# Patient Record
Sex: FEMALE | Race: BLACK OR AFRICAN AMERICAN | Employment: UNEMPLOYED | URBAN - METROPOLITAN AREA
[De-identification: names, ages, dates, MRNs, and addresses within clinical notes are randomized per-mention and may not be internally consistent; named-entity substitution may affect disease eponyms.]

---

## 2018-01-09 ENCOUNTER — HOSPITAL ENCOUNTER (INPATIENT)
Age: 23
LOS: 5 days | Discharge: HOME | DRG: 751 | End: 2018-01-15
Attending: EMERGENCY MEDICINE | Admitting: PSYCHIATRY & NEUROLOGY
Payer: MEDICAID

## 2018-01-09 DIAGNOSIS — F32.0 MILD SINGLE CURRENT EPISODE OF MAJOR DEPRESSIVE DISORDER (HCC): Primary | ICD-10-CM

## 2018-01-09 LAB
ACETAMINOPHEN LEVEL: <15 UG/ML (ref 10–30)
ALBUMIN SERPL-MCNC: 4.7 G/DL (ref 3.9–4.9)
ALP BLD-CCNC: 90 U/L (ref 40–130)
ALT SERPL-CCNC: 22 U/L (ref 0–33)
AMPHETAMINE SCREEN, URINE: NORMAL
ANION GAP SERPL CALCULATED.3IONS-SCNC: 30 MEQ/L (ref 7–13)
AST SERPL-CCNC: 18 U/L (ref 0–35)
BACTERIA: ABNORMAL /HPF
BARBITURATE SCREEN URINE: NORMAL
BASOPHILS ABSOLUTE: 0.1 K/UL (ref 0–0.2)
BASOPHILS RELATIVE PERCENT: 0.6 %
BENZODIAZEPINE SCREEN, URINE: NORMAL
BILIRUB SERPL-MCNC: 0.4 MG/DL (ref 0–1.2)
BILIRUBIN URINE: NEGATIVE
BLOOD, URINE: NEGATIVE
BUN BLDV-MCNC: 11 MG/DL (ref 6–20)
CALCIUM SERPL-MCNC: 9.9 MG/DL (ref 8.6–10.2)
CANNABINOID SCREEN URINE: NORMAL
CASTS: ABNORMAL /LPF
CHLORIDE BLD-SCNC: 103 MEQ/L (ref 98–107)
CHP ED QC CHECK: YES
CK MB: 1.5 NG/ML (ref 0–3.8)
CLARITY: CLEAR
CO2: 11 MEQ/L (ref 22–29)
COCAINE METABOLITE SCREEN URINE: NORMAL
COLOR: YELLOW
CREAT SERPL-MCNC: 0.92 MG/DL (ref 0.5–0.9)
CREATINE KINASE-MB INDEX: 0.6 % (ref 0–3.5)
EKG ATRIAL RATE: 117 BPM
EKG P AXIS: 60 DEGREES
EKG P-R INTERVAL: 130 MS
EKG Q-T INTERVAL: 334 MS
EKG QRS DURATION: 78 MS
EKG QTC CALCULATION (BAZETT): 465 MS
EKG R AXIS: 30 DEGREES
EKG T AXIS: 32 DEGREES
EKG VENTRICULAR RATE: 117 BPM
EOSINOPHILS ABSOLUTE: 0.1 K/UL (ref 0–0.7)
EOSINOPHILS RELATIVE PERCENT: 0.7 %
EPITHELIAL CELLS, UA: ABNORMAL /HPF
ETHANOL PERCENT: NORMAL G/DL
ETHANOL: <10 MG/DL (ref 0–0.08)
GFR AFRICAN AMERICAN: >60
GFR NON-AFRICAN AMERICAN: >60
GLOBULIN: 4.3 G/DL (ref 2.3–3.5)
GLUCOSE BLD-MCNC: 148 MG/DL (ref 74–109)
GLUCOSE URINE: NEGATIVE MG/DL
HCT VFR BLD CALC: 46.7 % (ref 37–47)
HEMOGLOBIN: 14.9 G/DL (ref 12–16)
KETONES, URINE: ABNORMAL MG/DL
LEUKOCYTE ESTERASE, URINE: ABNORMAL
LYMPHOCYTES ABSOLUTE: 4.8 K/UL (ref 1–4.8)
LYMPHOCYTES RELATIVE PERCENT: 38.9 %
Lab: NORMAL
MCH RBC QN AUTO: 26.9 PG (ref 27–31.3)
MCHC RBC AUTO-ENTMCNC: 32 % (ref 33–37)
MCV RBC AUTO: 84.1 FL (ref 82–100)
MONOCYTES ABSOLUTE: 1.2 K/UL (ref 0.2–0.8)
MONOCYTES RELATIVE PERCENT: 9.6 %
MUCUS: PRESENT
NEUTROPHILS ABSOLUTE: 6.2 K/UL (ref 1.4–6.5)
NEUTROPHILS RELATIVE PERCENT: 50.2 %
NITRITE, URINE: NEGATIVE
OPIATE SCREEN URINE: NORMAL
PDW BLD-RTO: 14.8 % (ref 11.5–14.5)
PH UA: 6 (ref 5–9)
PHENCYCLIDINE SCREEN URINE: NORMAL
PLATELET # BLD: 333 K/UL (ref 130–400)
POTASSIUM SERPL-SCNC: 4.5 MEQ/L (ref 3.5–5.1)
PREGNANCY TEST URINE, POC: NEGATIVE
PROTEIN UA: 100 MG/DL
RBC # BLD: 5.55 M/UL (ref 4.2–5.4)
RBC UA: ABNORMAL /HPF (ref 0–2)
RENAL EPITHELIAL, UA: ABNORMAL /HPF
SODIUM BLD-SCNC: 144 MEQ/L (ref 132–144)
SPECIFIC GRAVITY UA: 1.03 (ref 1–1.03)
TOTAL CK: 268 U/L (ref 0–170)
TOTAL PROTEIN: 9 G/DL (ref 6.4–8.1)
TSH SERPL DL<=0.05 MIU/L-ACNC: 1.51 UIU/ML (ref 0.27–4.2)
URINE REFLEX TO CULTURE: YES
UROBILINOGEN, URINE: 0.2 E.U./DL
WBC # BLD: 12.4 K/UL (ref 4.5–11)
WBC UA: ABNORMAL /HPF (ref 0–5)

## 2018-01-09 PROCEDURE — 80053 COMPREHEN METABOLIC PANEL: CPT

## 2018-01-09 PROCEDURE — G0480 DRUG TEST DEF 1-7 CLASSES: HCPCS

## 2018-01-09 PROCEDURE — 81001 URINALYSIS AUTO W/SCOPE: CPT

## 2018-01-09 PROCEDURE — 80307 DRUG TEST PRSMV CHEM ANLYZR: CPT

## 2018-01-09 PROCEDURE — 99285 EMERGENCY DEPT VISIT HI MDM: CPT

## 2018-01-09 PROCEDURE — 82550 ASSAY OF CK (CPK): CPT

## 2018-01-09 PROCEDURE — 84443 ASSAY THYROID STIM HORMONE: CPT

## 2018-01-09 PROCEDURE — 82553 CREATINE MB FRACTION: CPT

## 2018-01-09 PROCEDURE — 36415 COLL VENOUS BLD VENIPUNCTURE: CPT

## 2018-01-09 PROCEDURE — 85025 COMPLETE CBC W/AUTO DIFF WBC: CPT

## 2018-01-09 PROCEDURE — 87086 URINE CULTURE/COLONY COUNT: CPT

## 2018-01-09 RX ORDER — PROCHLORPERAZINE MALEATE 5 MG/1
5 TABLET ORAL EVERY 6 HOURS PRN
Status: ON HOLD | COMMUNITY
End: 2018-01-15 | Stop reason: HOSPADM

## 2018-01-09 RX ORDER — BUPROPION HYDROCHLORIDE 150 MG/1
150 TABLET, EXTENDED RELEASE ORAL 2 TIMES DAILY
Status: ON HOLD | COMMUNITY
End: 2018-01-15 | Stop reason: HOSPADM

## 2018-01-09 RX ORDER — CITALOPRAM 40 MG/1
40 TABLET ORAL DAILY
Status: ON HOLD | COMMUNITY
End: 2018-11-02 | Stop reason: HOSPADM

## 2018-01-09 RX ORDER — TRAZODONE HYDROCHLORIDE 50 MG/1
50 TABLET ORAL NIGHTLY
Status: ON HOLD | COMMUNITY
End: 2018-01-15 | Stop reason: HOSPADM

## 2018-01-09 ASSESSMENT — ENCOUNTER SYMPTOMS
VOMITING: 0
CHEST TIGHTNESS: 0
WHEEZING: 0
COUGH: 0
ABDOMINAL PAIN: 0
EYE DISCHARGE: 0
SORE THROAT: 0
ABDOMINAL DISTENTION: 0
SHORTNESS OF BREATH: 0
PHOTOPHOBIA: 0

## 2018-01-09 NOTE — Clinical Note
Patient Class: Inpatient [101]   REQUIRED: Diagnosis: Major depression, recurrent ([facility] [[id number]]   Estimated Length of Stay: Estimated stay of more than 2 midnights   Future Attending Provider: Edson Phillips [[id number]]   Admitting Provider: Farzaneh Ca [[id number]]

## 2018-01-10 PROBLEM — F43.12 CHRONIC POST-TRAUMATIC STRESS DISORDER (PTSD): Status: ACTIVE | Noted: 2018-01-10

## 2018-01-10 PROBLEM — F33.9 MAJOR DEPRESSION, RECURRENT (HCC): Status: ACTIVE | Noted: 2018-01-10

## 2018-01-10 PROBLEM — F33.9 MAJOR DEPRESSION, RECURRENT (HCC): Status: RESOLVED | Noted: 2018-01-10 | Resolved: 2018-01-10

## 2018-01-10 PROBLEM — F33.2 MDD (MAJOR DEPRESSIVE DISORDER), RECURRENT EPISODE, SEVERE (HCC): Status: ACTIVE | Noted: 2018-01-10

## 2018-01-10 LAB
BASOPHILS ABSOLUTE: 0.1 K/UL (ref 0–0.2)
BASOPHILS RELATIVE PERCENT: 0.7 %
EOSINOPHILS ABSOLUTE: 0.1 K/UL (ref 0–0.7)
EOSINOPHILS RELATIVE PERCENT: 1.4 %
HCT VFR BLD CALC: 43.9 % (ref 37–47)
HEMOGLOBIN: 14.3 G/DL (ref 12–16)
LYMPHOCYTES ABSOLUTE: 3.1 K/UL (ref 1–4.8)
LYMPHOCYTES RELATIVE PERCENT: 29.6 %
MCH RBC QN AUTO: 26.8 PG (ref 27–31.3)
MCHC RBC AUTO-ENTMCNC: 32.5 % (ref 33–37)
MCV RBC AUTO: 82.5 FL (ref 82–100)
MONOCYTES ABSOLUTE: 1.1 K/UL (ref 0.2–0.8)
MONOCYTES RELATIVE PERCENT: 10.3 %
NEUTROPHILS ABSOLUTE: 6 K/UL (ref 1.4–6.5)
NEUTROPHILS RELATIVE PERCENT: 58 %
PDW BLD-RTO: 14.5 % (ref 11.5–14.5)
PLATELET # BLD: 277 K/UL (ref 130–400)
RBC # BLD: 5.32 M/UL (ref 4.2–5.4)
WBC # BLD: 10.4 K/UL (ref 4.5–11)

## 2018-01-10 PROCEDURE — 87040 BLOOD CULTURE FOR BACTERIA: CPT

## 2018-01-10 PROCEDURE — 36415 COLL VENOUS BLD VENIPUNCTURE: CPT

## 2018-01-10 PROCEDURE — 85025 COMPLETE CBC W/AUTO DIFF WBC: CPT

## 2018-01-10 PROCEDURE — 93005 ELECTROCARDIOGRAM TRACING: CPT

## 2018-01-10 PROCEDURE — 6370000000 HC RX 637 (ALT 250 FOR IP): Performed by: NURSE PRACTITIONER

## 2018-01-10 PROCEDURE — 1240000000 HC EMOTIONAL WELLNESS R&B

## 2018-01-10 PROCEDURE — 93010 ELECTROCARDIOGRAM REPORT: CPT | Performed by: INTERNAL MEDICINE

## 2018-01-10 PROCEDURE — 99223 1ST HOSP IP/OBS HIGH 75: CPT | Performed by: PSYCHIATRY & NEUROLOGY

## 2018-01-10 PROCEDURE — 6370000000 HC RX 637 (ALT 250 FOR IP): Performed by: PSYCHIATRY & NEUROLOGY

## 2018-01-10 RX ORDER — HYDROXYZINE HYDROCHLORIDE 50 MG/ML
50 INJECTION, SOLUTION INTRAMUSCULAR EVERY 6 HOURS PRN
Status: DISCONTINUED | OUTPATIENT
Start: 2018-01-10 | End: 2018-01-15 | Stop reason: HOSPADM

## 2018-01-10 RX ORDER — CITALOPRAM 20 MG/1
40 TABLET ORAL DAILY
Status: DISCONTINUED | OUTPATIENT
Start: 2018-01-10 | End: 2018-01-15 | Stop reason: HOSPADM

## 2018-01-10 RX ORDER — TRAZODONE HYDROCHLORIDE 50 MG/1
50 TABLET ORAL NIGHTLY PRN
Status: DISCONTINUED | OUTPATIENT
Start: 2018-01-10 | End: 2018-01-11

## 2018-01-10 RX ORDER — HALOPERIDOL 5 MG
5 TABLET ORAL EVERY 6 HOURS PRN
Status: DISCONTINUED | OUTPATIENT
Start: 2018-01-10 | End: 2018-01-15 | Stop reason: HOSPADM

## 2018-01-10 RX ORDER — IBUPROFEN 400 MG/1
400 TABLET ORAL EVERY 6 HOURS PRN
Status: DISCONTINUED | OUTPATIENT
Start: 2018-01-10 | End: 2018-01-15 | Stop reason: HOSPADM

## 2018-01-10 RX ORDER — ACETAMINOPHEN 325 MG/1
650 TABLET ORAL EVERY 4 HOURS PRN
Status: DISCONTINUED | OUTPATIENT
Start: 2018-01-10 | End: 2018-01-10

## 2018-01-10 RX ADMIN — IBUPROFEN 400 MG: 400 TABLET, FILM COATED ORAL at 16:48

## 2018-01-10 RX ADMIN — CITALOPRAM HYDROBROMIDE 40 MG: 20 TABLET ORAL at 11:44

## 2018-01-10 ASSESSMENT — PAIN SCALES - GENERAL: PAINLEVEL_OUTOF10: 0

## 2018-01-10 ASSESSMENT — PAIN - FUNCTIONAL ASSESSMENT: PAIN_FUNCTIONAL_ASSESSMENT: 0-10
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
## 2018-01-10 NOTE — ED NOTES
[facility] RN aware of pt medically cleared, they will call when ready to get pt      Cheryl Dubois RN  01/09/18 7681

## 2018-01-10 NOTE — ED NOTES
Pt in bed, lights off, curtain open to visualize pt, call light with in reach, eyes closed, pt lying on right side.  She appears in no acute distress at this time will continue to monitor      Shivani Pascual RN  01/09/18 6684

## 2018-01-10 NOTE — ED NOTES
Pt assisted to bedside commode with help of this RN and 1 tech, urine sample obtained and sent, POC preg ran     Nando Skelton, [address] [facility]  01/09/18 7378

## 2018-01-10 NOTE — PROGRESS NOTES
Pt. refused to attend the 1100 skills group due to resting in room, despite staff encouragement.  Electronically signed by Paco Watts on 1/10/2018 at 1:22 PM

## 2018-01-10 NOTE — PROGRESS NOTES
Patient arrived to the unit via wheelchair accompanied by RN and security. Pt was asked to change into hospital pants and gown, skin and contraband check completed by this and ER RN, skin intact, no contraband found. Pt oriented to unit surroundings, policies and services, shown to room and advised of the need to complete admission assessment and sign consents, denies any needs at this time. Pt denies current suicidal thoughts, states she is tired and declined admission assessment/consents.  Electronically signed by Kathie Lucio RN on 1/10/18 at 4:59 AM

## 2018-01-10 NOTE — ED NOTES
Pt tolerated move to MARYCARMEN, via w/c. Used bathroom, gait steady. Given orientation to unit and [address] process with understanding verbalized. Report received from Dr Shelton Garibay re medical clearance.      Long Knowles RN  01/10/18 0005

## 2018-01-10 NOTE — PROGRESS NOTES
Patient denies SI, HI, and AVH. Patient reports depression is 1/10. Reports that being away from home environment has made depression minimal. Denies any anxiety. Patient has been resting in room throughout evening. Polite and cooperative with staff.  Electronically signed by Laya Campoverde LPN on 6/36/5257 at 0:04 PM

## 2018-01-10 NOTE — CONSULTS
and otherwise negative. OBJECTIVE  PHYSICAL EXAM: BP (!) 144/90   Pulse 107   Temp 99.1 °F (37.3 °C)   Resp 18   Wt (!) 352 lb (159.7 kg)   LMP  (LMP Unknown)   SpO2 98%     General appearance:  No apparent distress, appears stated age and cooperative. HEENT:  Normal cephalic, atraumatic without obvious deformity. Pupils equal, round, and reactive to light. Extra ocular muscles intact. Conjunctivae/corneas clear. Neck: Supple, with full range of motion. No jugular venous distention. Trachea midline. Respiratory:  Normal respiratory effort. Clear to auscultation, bilaterally without Rales/Wheezes/Rhonchi. Cardiovascular:  Regular rate and rhythm with normal S1/S2 without murmurs, rubs or gallops. Abdomen: Soft, non-tender, non-distended with normal bowel sounds. Musculoskeletal:  No clubbing, cyanosis or edema bilaterally. Full range of motion without deformity. Skin: Skin color, texture, turgor normal.  No rashes or lesions. Neurologic:  Neurovascularly intact without any focal sensory/motor deficits. Psychiatric:  Alert and oriented, thought content appropriate, normal insight  Capillary Refill: Brisk,< 3 seconds   Peripheral Pulses: +2 palpable, equal bilaterally     DATA:     Diagnostic tests reviewed for today's visit:    Most recent labs and imaging results reviewed. ASSESSMENT AND PLAN  Patient Active Hospital Problem List:   MDD (major depressive disorder), recurrent episode, severe ([facility] (1/10/2018)    Assessment:     Plan:    Chronic post-traumatic stress disorder (PTSD) (1/10/2018)    Assessment:     Plan:     Fever of unknown origin: cultures pending, tylenol prn  Leukocytosis: cbc pending        This is only a history and physical examination and not medical management.  The patient is to contact and follow up with their primary care physician and go over any abnormal labs, imaging, findings, medical concerns, or conditions that we have and have not addressed during this

## 2018-01-10 NOTE — ED PROVIDER NOTES
problems and hallucinations. All other systems reviewed and are negative. Except as noted above the remainder of the review of systems was reviewed and negative. PAST MEDICAL HISTORY     Past Medical History:   Diagnosis Date    Depression     PTSD (post-traumatic stress disorder)          SURGICAL HISTORY     History reviewed. No pertinent surgical history. CURRENT MEDICATIONS       Previous Medications    BUPROPION (WELLBUTRIN SR) 150 MG EXTENDED RELEASE TABLET    Take 150 mg by mouth 2 times daily    CITALOPRAM (CELEXA) 40 MG TABLET    Take 40 mg by mouth daily    PROCHLORPERAZINE (COMPAZINE) 5 MG TABLET    Take 5 mg by mouth every 6 hours as needed for Nausea    TRAZODONE (DESYREL) 50 MG TABLET    Take 50 mg by mouth nightly       ALLERGIES     Tylenol [acetaminophen]    FAMILY HISTORY     History reviewed. No pertinent family history. SOCIAL HISTORY       Social History     Social History    Marital status: Single     Spouse name: N/A    Number of children: N/A    Years of education: N/A     Social History Main Topics    Smoking status: Never Smoker    Smokeless tobacco: Never Used    Alcohol use No    Drug use: Unknown    Sexual activity: Not Asked     Other Topics Concern    None     Social History Narrative    None       SCREENINGS    [place] Coma Scale  Eye Opening: Spontaneous  Best Verbal Response: Oriented  Best Motor Response: Obeys commands  [place] Coma Scale Score: 15        PHYSICAL EXAM    (up to 7 for level 4, 8 or more for level 5)     ED Triage Vitals [01/09/18 2003]   BP Temp Temp Source Pulse Resp SpO2 Height Weight   (!) 146/85 98 °F (36.7 °C) Oral 141 20 99 % -- (!) 352 lb (159.7 kg)       Physical Exam   Constitutional: She is oriented to person, place, and time. She appears well-developed. HENT:   Head: Normocephalic. Nose: Nose normal.   Eyes: Conjunctivae are normal. Pupils are equal, round, and reactive to light. Neck: Normal range of motion. with a voice recognition program.  Efforts were made to edit the dictations but occasionally words are mis-transcribed.)    Roula Weaver MD (electronically signed)  Attending Emergency Physician          Roula Weaver MD  01/09/18 8397       Roula Weaver MD  01/10/18 2829

## 2018-01-11 LAB — URINE CULTURE, ROUTINE: NORMAL

## 2018-01-11 PROCEDURE — 1240000000 HC EMOTIONAL WELLNESS R&B

## 2018-01-11 PROCEDURE — 99232 SBSQ HOSP IP/OBS MODERATE 35: CPT | Performed by: PSYCHIATRY & NEUROLOGY

## 2018-01-11 PROCEDURE — 6370000000 HC RX 637 (ALT 250 FOR IP): Performed by: PSYCHIATRY & NEUROLOGY

## 2018-01-11 RX ORDER — TRAZODONE HYDROCHLORIDE 50 MG/1
50 TABLET ORAL NIGHTLY
Status: DISCONTINUED | OUTPATIENT
Start: 2018-01-11 | End: 2018-01-12

## 2018-01-11 RX ADMIN — CITALOPRAM HYDROBROMIDE 40 MG: 20 TABLET ORAL at 09:25

## 2018-01-11 RX ADMIN — TRAZODONE HYDROCHLORIDE 50 MG: 50 TABLET ORAL at 20:59

## 2018-01-11 NOTE — PLAN OF CARE
Problem: Altered Mood, Depressive Behavior  Goal: LTG-Able to verbalize acceptance of life and situations over which he or she has no control  Outcome: Ongoing    Goal: LTG-Able to verbalize and/or display a decrease in depressive symptoms  Outcome: Met This Shift    Goal: STG-Able to verbalize suicidal ideations  Outcome: Met This Shift    Goal: STG-Able to verbalize support system  Outcome: Ongoing    Goal: LTG-Absence of self-harm  Outcome: Met This Shift    Goal: STG-Knowledge of positive coping patterns  Outcome: Ongoing    Goal: Patient Specific Goal  Outcome: Ongoing    Goal: Participation in care planning  Outcome: Ongoing

## 2018-01-11 NOTE — PROGRESS NOTES
Patient out of room for meals. Spent time in room, patient reports that her Celexa makes her tired. She denied SI, HI, and AVH. Affect was reactive and she had good eye contact. She was polite, cooperative and pleasant with assessment. She also spoke about difficulty getting along with her Mother and is hopeful to move out soon and get her own place. 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 Pt hypertensive in AM, recheck WNL. No additional complaint voiced to staff Will continue to monitor.

## 2018-01-12 PROCEDURE — 6370000000 HC RX 637 (ALT 250 FOR IP): Performed by: PSYCHIATRY & NEUROLOGY

## 2018-01-12 PROCEDURE — 90833 PSYTX W PT W E/M 30 MIN: CPT | Performed by: PSYCHIATRY & NEUROLOGY

## 2018-01-12 PROCEDURE — 1240000000 HC EMOTIONAL WELLNESS R&B

## 2018-01-12 PROCEDURE — 99232 SBSQ HOSP IP/OBS MODERATE 35: CPT | Performed by: PSYCHIATRY & NEUROLOGY

## 2018-01-12 RX ORDER — TRAZODONE HYDROCHLORIDE 100 MG/1
100 TABLET ORAL NIGHTLY
Status: DISCONTINUED | OUTPATIENT
Start: 2018-01-12 | End: 2018-01-13

## 2018-01-12 RX ORDER — DOCUSATE SODIUM 100 MG/1
100 CAPSULE, LIQUID FILLED ORAL DAILY
Status: DISCONTINUED | OUTPATIENT
Start: 2018-01-12 | End: 2018-01-15 | Stop reason: HOSPADM

## 2018-01-12 RX ADMIN — CITALOPRAM HYDROBROMIDE 40 MG: 20 TABLET ORAL at 09:19

## 2018-01-12 RX ADMIN — DOCUSATE SODIUM 100 MG: 100 CAPSULE, LIQUID FILLED ORAL at 13:07

## 2018-01-12 RX ADMIN — TRAZODONE HYDROCHLORIDE 100 MG: 100 TABLET ORAL at 20:53

## 2018-01-12 ASSESSMENT — LIFESTYLE VARIABLES: HISTORY_ALCOHOL_USE: NO

## 2018-01-12 NOTE — CARE COORDINATION
Brief Intervention and Referral to Treatment Summary    Patient was provided PHQ-9, AUDIT and DAST Screening:      PHQ-9 Score: not completed  AUDIT Score:  0  DAST Score: 0    Patients substance use is considered    Low Risk/Healthy x  Risk  Harmful  Dependent    Patients depression is considered:    Minimal  Mild   Moderate x per pt. Moderately Severe  Severe    Brief Education was provided:    Patient was receptive x  Patient was not receptive      Brief Intervention Is Provided (Only for AUDIT or DAST, there is no brief intervention for Depression)    Patient reports readiness to decrease and/or stop use and a plan was discussed   Patient denies readiness to decrease and/or stop use and a plan was not discussed      Recommendations/Referrals for Brief and/or Specialized Treatment Provided to Patient  Patient denies use of ETOH or illegal drugs. Pt. Rece[facility] at the [facility].   Electronically signed by Katalina Vines on 1/12/2018 at 8:05 AM

## 2018-01-12 NOTE — CARE COORDINATION
FAMILY COLLATERAL NOTE    Family/Support Name: Waylon Johnson  Contact #:[phone]  Relationship to Pt[de-identified] mom        Family/Support contact aware of hospitalization:yes  Presenting Symptoms/Current Concerns: she had a bad attitude towards me, I feel like it was over something and it happened too fast, I dont remember too much about what happened. I got up and left the house before anything happened. I think that is when she took the overdose. I came back and she was gone and I dont know where she went. She did not say anything to me about wanting to harm herself. She did say to me and my mom that she thought she was a burden and we told her she was not. Top 3 Life Stressors:   I really dont know cause we really dont talk much. Background History Relevant to Current Hospitalization: my mom had custody of her since she was 8 mos old          Family Mental Health/Substance Use History: I have dystemia        Support Network's Goal for Hospitalization: I would like for her to stop feeling like I dont care about her, I do care, I just dont know how to react or what to say, she has a great personality and is a wonderful person    Discharge Plan: patient can come back to the home to live, follow up with outpt. Support Network Supportive of Discharge Plan:  yes      Support can confirm Safety of Location and Security of Weapons: there are no weapons in the home, home is safe      Support agreeable to [employer] and Monitor Medications (including Prescription and OTC): I can lock up all meds and all OTC meds    Identified Barriers to Compliance with Discharge Plan: none    Recommendations for Support Network:   [facility] or [facility] if any questions after discharge.   Electronically signed by Katalina Gandhi on 1/12/2018 at 10:54 AM      Katalina Gandhi

## 2018-01-12 NOTE — PROGRESS NOTES
01/11/18 2059    citalopram (CELEXA) tablet 40 mg, 40 mg, Oral, Daily, Aislinn Rebollar MD, 40 mg at 01/12/18 0919    haloperidol (HALDOL) tablet 5 mg, 5 mg, Oral, Q6H PRN, Aislinn Rebollar MD    hydrOXYzine (VISTARIL) injection 50 mg, 50 mg, Intramuscular, Q6H PRN, Aislinn Rebollar MD    ibuprofen (ADVIL;MOTRIN) tablet 400 mg, 400 mg, Oral, Q6H PRN, Rogelio Garber, CNP, 400 mg at 01/10/18 1648      Examination:  /81   Pulse 84   Temp 98 °F (36.7 °C)   Resp 20   Ht 5' 9\" (1.753 m) Comment:  estimate  Wt (!) 352 lb (159.7 kg)   LMP  (LMP Unknown)   SpO2 97%   BMI 51.98 kg/m²   Gait - steady  Medication side effects(SE): no    Mental Status Examination:    Level of consciousness:  within normal limits   Appearance:  fair grooming and fair hygiene  Behavior/Motor:  psychomotor retardation  Attitude toward examiner:  cooperative  Speech:  slow   Mood: less depressed  Affect:  mood congruent  Thought processes:  linear and goal directed   Thought content:  Suicidal Ideation:  denies suicidal ideation  Delusions:  no evidence of delusions  Perceptual Disturbance:  denies any perceptual disturbance  Cognition:  oriented to person, place, and time   Concentration intact  Insight fair   Judgement fair     ASSESSMENT:   Patient symptoms are:  [] Well controlled  [x] Improving  [] Worsening  [] No change      Diagnosis:   Active Problems:    MDD (major depressive disorder), recurrent episode, severe (HCC)    Chronic post-traumatic stress disorder (PTSD)      LABS:    Recent Labs      01/09/18   2015  01/10/18   1210   WBC  12.4*  10.4   HGB  14.9  14.3   PLT  333  277     Recent Labs      01/09/18 2015   NA  144   K  4.5   CL  103   CO2  11*   BUN  11   CREATININE  0.92*   GLUCOSE  148*     Recent Labs      01/09/18 2015   BILITOT  0.4   ALKPHOS  90   AST  18   ALT  22     Lab Results   Component Value Date    LABAMPH Neg 01/09/2018    BARBSCNU Neg 01/09/2018    LABBENZ Neg 01/09/2018

## 2018-01-13 PROCEDURE — 6370000000 HC RX 637 (ALT 250 FOR IP): Performed by: PSYCHIATRY & NEUROLOGY

## 2018-01-13 PROCEDURE — 1240000000 HC EMOTIONAL WELLNESS R&B

## 2018-01-13 PROCEDURE — 6370000000 HC RX 637 (ALT 250 FOR IP): Performed by: NURSE PRACTITIONER

## 2018-01-13 RX ORDER — HYDROCHLOROTHIAZIDE 25 MG/1
25 TABLET ORAL DAILY
Status: DISCONTINUED | OUTPATIENT
Start: 2018-01-13 | End: 2018-01-15 | Stop reason: HOSPADM

## 2018-01-13 RX ORDER — TRAZODONE HYDROCHLORIDE 100 MG/1
200 TABLET ORAL NIGHTLY
Status: DISCONTINUED | OUTPATIENT
Start: 2018-01-13 | End: 2018-01-13

## 2018-01-13 RX ORDER — LANOLIN ALCOHOL/MO/W.PET/CERES
6 CREAM (GRAM) TOPICAL NIGHTLY PRN
Status: DISCONTINUED | OUTPATIENT
Start: 2018-01-13 | End: 2018-01-15 | Stop reason: HOSPADM

## 2018-01-13 RX ORDER — TRAZODONE HYDROCHLORIDE 150 MG/1
150 TABLET ORAL NIGHTLY PRN
Status: DISCONTINUED | OUTPATIENT
Start: 2018-01-13 | End: 2018-01-15 | Stop reason: HOSPADM

## 2018-01-13 RX ADMIN — MELATONIN TAB 3 MG 6 MG: 3 TAB at 21:16

## 2018-01-13 RX ADMIN — TRAZODONE HYDROCHLORIDE 150 MG: 150 TABLET ORAL at 21:16

## 2018-01-13 RX ADMIN — DOCUSATE SODIUM 100 MG: 100 CAPSULE, LIQUID FILLED ORAL at 08:03

## 2018-01-13 RX ADMIN — CITALOPRAM HYDROBROMIDE 40 MG: 20 TABLET ORAL at 08:03

## 2018-01-13 RX ADMIN — HYDROCHLOROTHIAZIDE 25 MG: 25 TABLET ORAL at 13:58

## 2018-01-13 ASSESSMENT — PAIN - FUNCTIONAL ASSESSMENT: PAIN_FUNCTIONAL_ASSESSMENT: 0-10

## 2018-01-13 NOTE — PROGRESS NOTES
Pt. attended the 0900 community meeting. Electronically signed by Narayan Betancourt [address] on 1/13/2018 at 11:18 AM

## 2018-01-13 NOTE — PROGRESS NOTES
Oblong Becca, CNP, 25 mg at 01/13/18 1358    traZODone (DESYREL) tablet 150 mg, 150 mg, Oral, Nightly PRN, Jennifer Sumner MD    melatonin tablet 6 mg, 6 mg, Oral, Nightly PRN, Jennifer Sumner MD    docusate sodium (COLACE) capsule 100 mg, 100 mg, Oral, Daily, Khadijah Serrato MD, 100 mg at 01/13/18 0803    citalopram (CELEXA) tablet 40 mg, 40 mg, Oral, Daily, Khadijah Serrato MD, 40 mg at 01/13/18 0803    haloperidol (HALDOL) tablet 5 mg, 5 mg, Oral, Q6H PRN, Khadijah Serrato MD    hydrOXYzine (VISTARIL) injection 50 mg, 50 mg, Intramuscular, Q6H PRN, Khadijah Serrato MD    ibuprofen (ADVIL;MOTRIN) tablet 400 mg, 400 mg, Oral, Q6H PRN, Yamilet Hudson CNP, 400 mg at 01/10/18 1648      Examination:  BP (!) 141/84   Pulse 119   Temp 98.6 °F (37 °C)   Resp 20   Ht 5' 9\" (1.753 m) Comment:  estimate  Wt (!) 352 lb (159.7 kg)   LMP  (LMP Unknown)   SpO2 99%   BMI 51.98 kg/m²   Gait - steady  Medication side effects(SE): no    Mental Status Examination:    Level of consciousness:  within normal limits   Appearance:  fair grooming and fair hygiene  Behavior/Motor:  psychomotor retardation  Attitude toward examiner:  cooperative  Speech:  slow   Mood: less depressed  Affect:  mood congruent  Thought processes:  linear and goal directed   Thought content:  Suicidal Ideation:  denies suicidal ideation  Delusions:  no evidence of delusions  Perceptual Disturbance:  denies any perceptual disturbance  Cognition:  oriented to person, place, and time   Concentration intact  Insight fair   Judgement fair     ASSESSMENT:   Patient symptoms are:  [] Well controlled  [x] Improving  [] Worsening  [] No change      Diagnosis:   Active Problems:    MDD (major depressive disorder), recurrent episode, severe (HCC)    Chronic post-traumatic stress disorder (PTSD)      LABS:    No results for input(s): WBC, HGB, PLT in the last 72 hours.   No results for input(s): NA, K, CL, CO2, BUN, CREATININE, GLUCOSE in the last 72 hours. No results for input(s): BILITOT, ALKPHOS, AST, ALT in the last 72 hours. Lab Results   Component Value Date    LABAMPH Neg 01/09/2018    BARBSCNU Neg 01/09/2018    LABBENZ Neg 01/09/2018    OPIATESCREENURINE Neg 01/09/2018    PHENCYCLIDINESCREENURINE Neg 01/09/2018    ETOH <10 01/09/2018     Lab Results   Component Value Date    TSH 1.510 01/09/2018     No results found for: LITHIUM  No results found for: VALPROATE, CBMZ      Treatment Plan:  Reviewed current Medications with the patient. Increase trazodone to 150 mg po qhs; add Melatonin  Continue celexa 40 mg   Risks, benefits, side effects, drug-to-drug interactions and alternatives to treatment were discussed. Collateral information: pending  CD evaluation  Encourage patient to attend group and other milieu activities.   Discharge planning discussed with the patient and treatment team.    PSYCHOTHERAPY/COUNSELING:  [x] Therapeutic interview  [x] Supportive  [] CBT  [] Ongoing  [] Other       [x] Patient continues to need, on a daily basis, active treatment furnished directly by or requiring the supervision of inpatient psychiatric personnel                  Electronically signed by Ming Loya MD on 1/13/2018 at 5:39 PM

## 2018-01-13 NOTE — PLAN OF CARE
Problem: Altered Mood, Depressive Behavior  Goal: LTG-Able to verbalize acceptance of life and situations over which he or she has no control  Outcome: Met This Shift    Goal: LTG-Able to verbalize and/or display a decrease in depressive symptoms  Outcome: Met This Shift    Goal: STG-Able to verbalize suicidal ideations  Outcome: Met This Shift    Goal: STG-Able to verbalize support system  Outcome: Met This Shift    Goal: LTG-Absence of self-harm  Outcome: Met This Shift    Goal: STG-Knowledge of positive coping patterns  Outcome: Met This Shift    Goal: Patient Specific Goal  Outcome: Met This Shift    Goal: Participation in care planning  Outcome: Met This Shift

## 2018-01-13 NOTE — PROGRESS NOTES
Group Therapy Note    Date: 1/13/2018  Start Time: 1000  End Time:  1100  Number of Participants: 10    Type of Group: Psychoeducation    Wellness Binder Information  Module Name:    Session Number:      Patient's Goal:  \"To feel better\"    Notes:  Patient was attentive and overall participation in group was fair. Status After Intervention:  Unchanged    Participation Level:  Active Listener    Participation Quality: Appropriate and Attentive      Speech:  normal      Thought Process/Content: Linear      Affective Functioning: Congruent      Mood: calm      Level of consciousness:  Alert and Oriented x4      Response to Learning: Able to verbalize current knowledge/experience      Endings: None Reported    Modes of Intervention: Education, Socialization and Activity      Discipline Responsible: Psychoeducational Specialist      Signature:  Lauren Devries

## 2018-01-14 PROCEDURE — 6370000000 HC RX 637 (ALT 250 FOR IP): Performed by: NURSE PRACTITIONER

## 2018-01-14 PROCEDURE — 1240000000 HC EMOTIONAL WELLNESS R&B

## 2018-01-14 PROCEDURE — 6370000000 HC RX 637 (ALT 250 FOR IP): Performed by: PSYCHIATRY & NEUROLOGY

## 2018-01-14 RX ADMIN — MELATONIN TAB 3 MG 6 MG: 3 TAB at 21:28

## 2018-01-14 RX ADMIN — DOCUSATE SODIUM 100 MG: 100 CAPSULE, LIQUID FILLED ORAL at 08:51

## 2018-01-14 RX ADMIN — CITALOPRAM HYDROBROMIDE 40 MG: 20 TABLET ORAL at 08:51

## 2018-01-14 RX ADMIN — TRAZODONE HYDROCHLORIDE 150 MG: 150 TABLET ORAL at 21:28

## 2018-01-14 RX ADMIN — HYDROCHLOROTHIAZIDE 25 MG: 25 TABLET ORAL at 08:51

## 2018-01-14 NOTE — PROGRESS NOTES
Group Therapy Note    Date: 1/14/2018  Start Time: 1000  End Time:  1100  Number of Participants: 10    Type of Group: Psychoeducation    Wellness Binder Information  Module Name:    Session Number:     Patient's Goal:  \"To feel better\"    Notes:  Patient was calm and overall participation was good. Status After Intervention:  Unchanged    Participation Level:  Active Listener    Participation Quality: Appropriate      Speech:  normal      Thought Process/Content: Linear      Affective Functioning: Congruent      Mood: calm      Level of consciousness:  Alert      Response to Learning: Able to verbalize current knowledge/experience and Progressing to goal      Endings: None Reported    Modes of Intervention: Education, Socialization and Activity      Discipline Responsible: Psychoeducational Specialist      Signature:  Sarah Reyez

## 2018-01-14 NOTE — BH NOTE
Group Therapy Note    Date: 1/14/2018  Start Time: 1100  End Time:  6821  Number of Participants: 10    Type of Group: Healthy Living/Wellness    Wellness Binder Information  Module Name:  Early warning signs     Status After Intervention:  Improved    Participation Level: Active Listener    Participation Quality: Appropriate      Speech:  normal      Thought Process/Content: Logical      Affective Functioning: Congruent      Mood: euthymic      Level of consciousness:  Alert and Oriented x4      Response to Learning: Able to verbalize current knowledge/experience      Endings: None Reported    Modes of Intervention: Education      Discipline Responsible: Licensed Practical Nurse      Signature:   Emiliana East LPN

## 2018-01-14 NOTE — PROGRESS NOTES
Oral, Daily, Shonda Britt CNP, 25 mg at 01/14/18 8151    traZODone (DESYREL) tablet 150 mg, 150 mg, Oral, Nightly PRN, Beatrice Thompson MD, 150 mg at 01/13/18 2116    melatonin tablet 6 mg, 6 mg, Oral, Nightly PRN, Beatrice Thompson MD, 6 mg at 01/13/18 2116    docusate sodium (COLACE) capsule 100 mg, 100 mg, Oral, Daily, Brandy Weeks MD, 100 mg at 01/14/18 0851    citalopram (CELEXA) tablet 40 mg, 40 mg, Oral, Daily, Brandy Weeks MD, 40 mg at 01/14/18 0851    haloperidol (HALDOL) tablet 5 mg, 5 mg, Oral, Q6H PRN, Brandy Weeks MD    hydrOXYzine (VISTARIL) injection 50 mg, 50 mg, Intramuscular, Q6H PRN, Brandy Weeks MD    ibuprofen (ADVIL;MOTRIN) tablet 400 mg, 400 mg, Oral, Q6H PRN, Shonda Britt CNP, 400 mg at 01/10/18 1648      Examination:  /89   Pulse 102   Temp 97.9 °F (36.6 °C) (Oral)   Resp 18   Ht 5' 9\" (1.753 m) Comment:  estimate  Wt (!) 352 lb (159.7 kg)   LMP  (LMP Unknown)   SpO2 98%   BMI 51.98 kg/m²   Gait - steady  Medication side effects(SE): no    Mental Status Examination:    Level of consciousness:  within normal limits   Appearance:  fair grooming and fair hygiene  Behavior/Motor:  psychomotor retardation  Attitude toward examiner:  cooperative  Speech:  slow   Mood: less depressed  Affect:  mood congruent  Thought processes:  linear and goal directed   Thought content:  Suicidal Ideation:  denies suicidal ideation  Delusions:  no evidence of delusions  Perceptual Disturbance:  denies any perceptual disturbance  Cognition:  oriented to person, place, and time   Concentration intact  Insight fair   Judgement fair     ASSESSMENT:   Patient symptoms are:  [] Well controlled  [x] Improving  [] Worsening  [] No change      Diagnosis:   Active Problems:    MDD (major depressive disorder), recurrent episode, severe (HCC)    Chronic post-traumatic stress disorder (PTSD)      LABS:    No results for input(s): WBC, HGB, PLT in the last 72 hours.   No results for

## 2018-01-15 VITALS
BODY MASS INDEX: 43.4 KG/M2 | HEIGHT: 69 IN | TEMPERATURE: 98 F | HEART RATE: 129 BPM | WEIGHT: 293 LBS | RESPIRATION RATE: 18 BRPM | OXYGEN SATURATION: 99 % | DIASTOLIC BLOOD PRESSURE: 107 MMHG | SYSTOLIC BLOOD PRESSURE: 124 MMHG

## 2018-01-15 LAB
BLOOD CULTURE, ROUTINE: NORMAL
CULTURE, BLOOD 2: NORMAL

## 2018-01-15 PROCEDURE — 99239 HOSP IP/OBS DSCHRG MGMT >30: CPT | Performed by: PSYCHIATRY & NEUROLOGY

## 2018-01-15 PROCEDURE — 6370000000 HC RX 637 (ALT 250 FOR IP): Performed by: NURSE PRACTITIONER

## 2018-01-15 PROCEDURE — 6370000000 HC RX 637 (ALT 250 FOR IP): Performed by: PSYCHIATRY & NEUROLOGY

## 2018-01-15 RX ORDER — TRAZODONE HYDROCHLORIDE 150 MG/1
150 TABLET ORAL NIGHTLY PRN
Qty: 15 TABLET | Refills: 1 | Status: ON HOLD | OUTPATIENT
Start: 2018-01-15 | End: 2018-11-02 | Stop reason: HOSPADM

## 2018-01-15 RX ADMIN — CITALOPRAM HYDROBROMIDE 40 MG: 20 TABLET ORAL at 08:16

## 2018-01-15 RX ADMIN — HYDROCHLOROTHIAZIDE 25 MG: 25 TABLET ORAL at 08:16

## 2018-01-15 RX ADMIN — DOCUSATE SODIUM 100 MG: 100 CAPSULE, LIQUID FILLED ORAL at 08:16

## 2018-01-15 NOTE — BH NOTE
Pt attended and participated in Recreation group at 0. Pt did not attend Wrap Up group at 2030.     Electronically signed by Lety Montiel on 1/14/2018 at 11:31 PM

## 2018-01-15 NOTE — DISCHARGE SUMMARY
These medications were sent to [address] [place] [address], [address],  [address], [place] [id number]    Phone:  [phone]   · traZODone 150 MG tablet         TIME SPEND - 35 MINUTES TO COMPLETE THE EVALUATION, DISCHARGE SUMMARY, MEDICATION RECONCILIATION AND FOLLOW UP CARE     Dorian Loya  1/15/2018  9:27 AM

## 2018-01-15 NOTE — PROGRESS NOTES
Group Therapy Note    Date: 1/15/2018  Start Time: 1100  End Time:  4240  Number of Participants: 6    Type of Group: Psychoeducation    Wellness Binder Information  Module Name:    Session Number:      Patient's Goal:  \"to go to groups and to feel better\"    Notes:  Pt. attended the skill group. Pt. was happy. Worked on project with attention to detail. Feels better and is positive about being dc. Status After Intervention:  Improved    Participation Level:  Active Listener and Interactive    Participation Quality: Appropriate, Attentive and Sharing      Speech:  normal      Thought Process/Content: Logical      Affective Functioning: Congruent      Mood: calm      Level of consciousness:  Alert, Oriented x4 and Attentive      Response to Learning: Progressing to goal      Endings: None Reported    Modes of Intervention: Education, Support, Socialization and Activity      Discipline Responsible: Psychoeducational Specialist      Signature:  Jamal Langford

## 2018-01-15 NOTE — PROGRESS NOTES
Patient denies SI, HI, and AVH. Patient denies depression and anxiety. Patient resting more this afternoon. More visible after dinner. Polite and cooperative with staff. Social with select peers.  Electronically signed by Corrine Mcpherson LPN on 3/87/3934 at 1:21 PM

## 2018-07-27 ENCOUNTER — HOSPITAL ENCOUNTER (EMERGENCY)
Age: 23
Discharge: HOME | End: 2018-07-27
Payer: MEDICAID

## 2018-07-27 VITALS
WEIGHT: 293 LBS | RESPIRATION RATE: 16 BRPM | HEIGHT: 67 IN | BODY MASS INDEX: 45.99 KG/M2 | HEART RATE: 104 BPM | OXYGEN SATURATION: 98 % | DIASTOLIC BLOOD PRESSURE: 77 MMHG | SYSTOLIC BLOOD PRESSURE: 115 MMHG | TEMPERATURE: 98.9 F

## 2018-07-27 DIAGNOSIS — J02.0 STREPTOCOCCAL SORE THROAT: Primary | ICD-10-CM

## 2018-07-27 PROCEDURE — 99282 EMERGENCY DEPT VISIT SF MDM: CPT

## 2018-07-27 RX ORDER — AMOXICILLIN 500 MG/1
500 TABLET, FILM COATED ORAL 3 TIMES DAILY
Qty: 30 TABLET | Refills: 0 | Status: SHIPPED | OUTPATIENT
Start: 2018-07-27 | End: 2018-08-06

## 2018-07-27 RX ORDER — IBUPROFEN 800 MG/1
800 TABLET ORAL EVERY 8 HOURS PRN
Qty: 20 TABLET | Refills: 0 | Status: SHIPPED | OUTPATIENT
Start: 2018-07-27 | End: 2018-10-29

## 2018-07-27 ASSESSMENT — ENCOUNTER SYMPTOMS
SHORTNESS OF BREATH: 0
TROUBLE SWALLOWING: 0
SORE THROAT: 1
COUGH: 0
NAUSEA: 0
ABDOMINAL PAIN: 0
VOMITING: 0
BACK PAIN: 0
DIARRHEA: 0

## 2018-07-27 ASSESSMENT — PAIN DESCRIPTION - LOCATION: LOCATION: THROAT

## 2018-07-27 ASSESSMENT — PAIN DESCRIPTION - PAIN TYPE: TYPE: ACUTE PAIN

## 2018-07-27 ASSESSMENT — PAIN SCALES - GENERAL: PAINLEVEL_OUTOF10: 7

## 2018-07-27 NOTE — ED PROVIDER NOTES
[facility] ED  eMERGENCY dEPARTMENT eNCOUnter      Pt Name: Pedro Harrington  MRN: [id number]  [place] 1995  Date of evaluation: 7/27/2018  Provider: WALESKA Ram CNP      HISTORY OF PRESENT ILLNESS    Pedro Harrington is a 26 y.o. female who presents to the Emergency Department with sore throat and sore on tongue x 2 days. Denies SOB, CP or fever. Has not taken any medication for her symptoms. REVIEW OF SYSTEMS       Review of Systems   Constitutional: Negative for activity change, appetite change and fever. HENT: Positive for mouth sores and sore throat. Negative for congestion, drooling and trouble swallowing. Respiratory: Negative for cough and shortness of breath. Cardiovascular: Negative for chest pain. Gastrointestinal: Negative for abdominal pain, diarrhea, nausea and vomiting. Genitourinary: Negative for dysuria. Musculoskeletal: Negative for arthralgias and back pain. Skin: Negative for rash. All other systems reviewed and are negative. PAST MEDICAL HISTORY     Past Medical History:   Diagnosis Date    Depression     PTSD (post-traumatic stress disorder)          SURGICAL HISTORY     History reviewed. No pertinent surgical history.       CURRENT MEDICATIONS       Previous Medications    CITALOPRAM (CELEXA) 40 MG TABLET    Take 40 mg by mouth daily    TRAZODONE (DESYREL) 150 MG TABLET    Take 1 tablet by mouth nightly as needed for Sleep       ALLERGIES     Tylenol [acetaminophen]    FAMILY HISTORY       Family History   Problem Relation Age of Onset    No Known Problems Mother         mental illness    No Known Problems Father           SOCIAL HISTORY       Social History     Social History    Marital status: Single     Spouse name: N/A    Number of children: N/A    Years of education: N/A     Social History Main Topics    Smoking status: Never Smoker    Smokeless tobacco: Never Used    Alcohol use No    Drug use: No    Sexual

## 2018-10-29 ENCOUNTER — HOSPITAL ENCOUNTER (INPATIENT)
Age: 23
LOS: 4 days | Discharge: HOME | DRG: 753 | End: 2018-11-02
Attending: PSYCHIATRY & NEUROLOGY | Admitting: PSYCHIATRY & NEUROLOGY
Payer: MEDICAID

## 2018-10-29 DIAGNOSIS — F33.1 MODERATE EPISODE OF RECURRENT MAJOR DEPRESSIVE DISORDER (HCC): Primary | ICD-10-CM

## 2018-10-29 PROBLEM — F33.9 DEPRESSION, MAJOR, RECURRENT (HCC): Status: ACTIVE | Noted: 2018-10-29

## 2018-10-29 LAB
ACETAMINOPHEN LEVEL: <5 UG/ML (ref 10–30)
ALBUMIN SERPL-MCNC: 4.3 G/DL (ref 3.9–4.9)
ALP BLD-CCNC: 77 U/L (ref 40–130)
ALT SERPL-CCNC: 21 U/L (ref 0–33)
AMPHETAMINE SCREEN, URINE: NORMAL
ANION GAP SERPL CALCULATED.3IONS-SCNC: 12 MEQ/L (ref 7–13)
AST SERPL-CCNC: 18 U/L (ref 0–35)
BARBITURATE SCREEN URINE: NORMAL
BENZODIAZEPINE SCREEN, URINE: NORMAL
BILIRUB SERPL-MCNC: 0.3 MG/DL (ref 0–1.2)
BILIRUBIN URINE: NEGATIVE
BLOOD, URINE: NEGATIVE
BUN BLDV-MCNC: 12 MG/DL (ref 6–20)
CALCIUM SERPL-MCNC: 9.6 MG/DL (ref 8.6–10.2)
CANNABINOID SCREEN URINE: NORMAL
CHLORIDE BLD-SCNC: 103 MEQ/L (ref 98–107)
CK MB: 2.4 NG/ML (ref 0–3.8)
CLARITY: CLEAR
CO2: 24 MEQ/L (ref 22–29)
COCAINE METABOLITE SCREEN URINE: NORMAL
COLOR: YELLOW
CREAT SERPL-MCNC: 0.7 MG/DL (ref 0.5–0.9)
CREATINE KINASE-MB INDEX: 0.8 % (ref 0–3.5)
EPITHELIAL CELLS, UA: NORMAL /HPF
ETHANOL PERCENT: NORMAL G/DL
ETHANOL: <10 MG/DL (ref 0–0.08)
GFR AFRICAN AMERICAN: >60
GFR NON-AFRICAN AMERICAN: >60
GLOBULIN: 4.3 G/DL (ref 2.3–3.5)
GLUCOSE BLD-MCNC: 91 MG/DL (ref 74–109)
GLUCOSE URINE: NEGATIVE MG/DL
HCG(URINE) PREGNANCY TEST: NEGATIVE
HCT VFR BLD CALC: 42.6 % (ref 37–47)
HEMOGLOBIN: 14.3 G/DL (ref 12–16)
KETONES, URINE: NEGATIVE MG/DL
LEUKOCYTE ESTERASE, URINE: NEGATIVE
Lab: NORMAL
MCH RBC QN AUTO: 27.7 PG (ref 27–31.3)
MCHC RBC AUTO-ENTMCNC: 33.6 % (ref 33–37)
MCV RBC AUTO: 82.4 FL (ref 82–100)
MUCUS: PRESENT
NITRITE, URINE: NEGATIVE
OPIATE SCREEN URINE: NORMAL
PDW BLD-RTO: 14 % (ref 11.5–14.5)
PH UA: 6 (ref 5–9)
PHENCYCLIDINE SCREEN URINE: NORMAL
PLATELET # BLD: 245 K/UL (ref 130–400)
POTASSIUM SERPL-SCNC: 3.9 MEQ/L (ref 3.5–5.1)
PROTEIN UA: 30 MG/DL
RBC # BLD: 5.17 M/UL (ref 4.2–5.4)
RBC UA: NORMAL /HPF (ref 0–2)
SALICYLATE, SERUM: <0.3 MG/DL (ref 15–30)
SODIUM BLD-SCNC: 139 MEQ/L (ref 132–144)
SPECIFIC GRAVITY UA: 1.03 (ref 1–1.03)
TOTAL CK: 287 U/L (ref 0–170)
TOTAL PROTEIN: 8.6 G/DL (ref 6.4–8.1)
TSH SERPL DL<=0.05 MIU/L-ACNC: 1.55 UIU/ML (ref 0.27–4.2)
URINE REFLEX TO CULTURE: YES
UROBILINOGEN, URINE: 0.2 E.U./DL
WBC # BLD: 9.9 K/UL (ref 4.8–10.8)
WBC UA: NORMAL /HPF (ref 0–5)

## 2018-10-29 PROCEDURE — 81001 URINALYSIS AUTO W/SCOPE: CPT

## 2018-10-29 PROCEDURE — G0480 DRUG TEST DEF 1-7 CLASSES: HCPCS

## 2018-10-29 PROCEDURE — 6370000000 HC RX 637 (ALT 250 FOR IP): Performed by: NURSE PRACTITIONER

## 2018-10-29 PROCEDURE — 82553 CREATINE MB FRACTION: CPT

## 2018-10-29 PROCEDURE — 84703 CHORIONIC GONADOTROPIN ASSAY: CPT

## 2018-10-29 PROCEDURE — 36415 COLL VENOUS BLD VENIPUNCTURE: CPT

## 2018-10-29 PROCEDURE — 1240000000 HC EMOTIONAL WELLNESS R&B

## 2018-10-29 PROCEDURE — 82550 ASSAY OF CK (CPK): CPT

## 2018-10-29 PROCEDURE — 80307 DRUG TEST PRSMV CHEM ANLYZR: CPT

## 2018-10-29 PROCEDURE — 99285 EMERGENCY DEPT VISIT HI MDM: CPT

## 2018-10-29 PROCEDURE — 80053 COMPREHEN METABOLIC PANEL: CPT

## 2018-10-29 PROCEDURE — 84443 ASSAY THYROID STIM HORMONE: CPT

## 2018-10-29 PROCEDURE — 85027 COMPLETE CBC AUTOMATED: CPT

## 2018-10-29 PROCEDURE — 87086 URINE CULTURE/COLONY COUNT: CPT

## 2018-10-29 RX ORDER — HALOPERIDOL 5 MG/ML
5 INJECTION INTRAMUSCULAR EVERY 4 HOURS PRN
Status: DISCONTINUED | OUTPATIENT
Start: 2018-10-29 | End: 2018-11-02 | Stop reason: HOSPADM

## 2018-10-29 RX ORDER — HYDROXYZINE PAMOATE 50 MG/1
50 CAPSULE ORAL ONCE
Status: COMPLETED | OUTPATIENT
Start: 2018-10-29 | End: 2018-10-29

## 2018-10-29 RX ORDER — TRAZODONE HYDROCHLORIDE 50 MG/1
50 TABLET ORAL NIGHTLY PRN
Status: DISCONTINUED | OUTPATIENT
Start: 2018-10-30 | End: 2018-11-02 | Stop reason: HOSPADM

## 2018-10-29 RX ORDER — BENZTROPINE MESYLATE 1 MG/ML
2 INJECTION INTRAMUSCULAR; INTRAVENOUS 2 TIMES DAILY PRN
Status: DISCONTINUED | OUTPATIENT
Start: 2018-10-29 | End: 2018-11-02 | Stop reason: HOSPADM

## 2018-10-29 RX ORDER — MAGNESIUM HYDROXIDE/ALUMINUM HYDROXICE/SIMETHICONE 120; 1200; 1200 MG/30ML; MG/30ML; MG/30ML
30 SUSPENSION ORAL PRN
Status: DISCONTINUED | OUTPATIENT
Start: 2018-10-29 | End: 2018-11-02 | Stop reason: HOSPADM

## 2018-10-29 RX ORDER — HYDROXYZINE PAMOATE 50 MG/1
50 CAPSULE ORAL EVERY 6 HOURS PRN
Status: DISCONTINUED | OUTPATIENT
Start: 2018-10-29 | End: 2018-11-02 | Stop reason: HOSPADM

## 2018-10-29 RX ADMIN — HYDROXYZINE PAMOATE 50 MG: 50 CAPSULE ORAL at 22:38

## 2018-10-29 ASSESSMENT — ENCOUNTER SYMPTOMS
COUGH: 0
VOICE CHANGE: 0
SORE THROAT: 0
TROUBLE SWALLOWING: 0
BACK PAIN: 0
COLOR CHANGE: 0
DIARRHEA: 0
SHORTNESS OF BREATH: 0
VOMITING: 0
NAUSEA: 0
CONSTIPATION: 0
ABDOMINAL PAIN: 0

## 2018-10-29 ASSESSMENT — PATIENT HEALTH QUESTIONNAIRE - PHQ9: SUM OF ALL RESPONSES TO PHQ QUESTIONS 1-9: 16

## 2018-10-30 PROBLEM — F31.9 BIPOLAR DEPRESSION (HCC): Status: ACTIVE | Noted: 2018-01-10

## 2018-10-30 LAB
EKG ATRIAL RATE: 100 BPM
EKG P AXIS: 54 DEGREES
EKG P-R INTERVAL: 122 MS
EKG Q-T INTERVAL: 366 MS
EKG QRS DURATION: 78 MS
EKG QTC CALCULATION (BAZETT): 472 MS
EKG R AXIS: 25 DEGREES
EKG T AXIS: 24 DEGREES
EKG VENTRICULAR RATE: 100 BPM

## 2018-10-30 PROCEDURE — 93005 ELECTROCARDIOGRAM TRACING: CPT

## 2018-10-30 PROCEDURE — 6370000000 HC RX 637 (ALT 250 FOR IP): Performed by: PSYCHIATRY & NEUROLOGY

## 2018-10-30 PROCEDURE — 1240000000 HC EMOTIONAL WELLNESS R&B

## 2018-10-30 PROCEDURE — 99222 1ST HOSP IP/OBS MODERATE 55: CPT | Performed by: PSYCHIATRY & NEUROLOGY

## 2018-10-30 RX ORDER — OXCARBAZEPINE 150 MG/1
150 TABLET, FILM COATED ORAL 2 TIMES DAILY
Status: DISCONTINUED | OUTPATIENT
Start: 2018-10-30 | End: 2018-11-02 | Stop reason: HOSPADM

## 2018-10-30 RX ADMIN — HYDROXYZINE PAMOATE 50 MG: 50 CAPSULE ORAL at 22:47

## 2018-10-30 RX ADMIN — OXCARBAZEPINE 150 MG: 150 TABLET, FILM COATED ORAL at 20:39

## 2018-10-30 RX ADMIN — LURASIDONE HYDROCHLORIDE 20 MG: 20 TABLET, FILM COATED ORAL at 12:29

## 2018-10-30 RX ADMIN — TRAZODONE HYDROCHLORIDE 50 MG: 50 TABLET ORAL at 20:39

## 2018-10-30 RX ADMIN — OXCARBAZEPINE 150 MG: 150 TABLET, FILM COATED ORAL at 12:29

## 2018-10-30 ASSESSMENT — SLEEP AND FATIGUE QUESTIONNAIRES
DO YOU HAVE DIFFICULTY SLEEPING: NO
AVERAGE NUMBER OF SLEEP HOURS: 8
SLEEP PATTERN: NORMAL
DO YOU USE A SLEEP AID: NO

## 2018-10-30 ASSESSMENT — PATIENT HEALTH QUESTIONNAIRE - PHQ9: SUM OF ALL RESPONSES TO PHQ QUESTIONS 1-9: 16

## 2018-10-30 NOTE — ED PROVIDER NOTES
Past Medical History:   Diagnosis Date    Depression     PTSD (post-traumatic stress disorder)     Seizures (HCC)     as a child last seizure 14years old       SURGICAL HISTORY     History reviewed. No pertinent surgical history. CURRENT MEDICATIONS       Previous Medications    CITALOPRAM (CELEXA) 40 MG TABLET    Take 40 mg by mouth daily    TRAZODONE (DESYREL) 150 MG TABLET    Take 1 tablet by mouth nightly as needed for Sleep       ALLERGIES     Tylenol [acetaminophen]    FAMILY HISTORY       Family History   Problem Relation Age of Onset    No Known Problems Mother         mental illness    No Known Problems Father           SOCIAL HISTORY       Social History     Social History    Marital status: Single     Spouse name: N/A    Number of children: N/A    Years of education: N/A     Social History Main Topics    Smoking status: Never Smoker    Smokeless tobacco: Never Used    Alcohol use No    Drug use: No    Sexual activity: No     Other Topics Concern    None     Social History Narrative    None       SCREENINGS           PHYSICAL EXAM    (up to 7 for level 4, 8 or more for level 5)     ED Triage Vitals   BP Temp Temp src Pulse Resp SpO2 Height Weight   -- -- -- -- -- -- -- --       Physical Exam   Constitutional: She is oriented to person, place, and time. She appears well-developed and well-nourished. No distress. HENT:   Head: Normocephalic and atraumatic. Eyes: Conjunctivae are normal. Right eye exhibits no discharge. Left eye exhibits no discharge. Neck: Normal range of motion. Neck supple. Cardiovascular: Normal rate and regular rhythm. Pulmonary/Chest: Effort normal and breath sounds normal.   Abdominal: Soft. Bowel sounds are normal.   Musculoskeletal: Normal range of motion. Neurological: She is alert and oriented to person, place, and time. Skin: Skin is warm and dry. Psychiatric: She has a normal mood and affect.    Nursing note and vitals

## 2018-10-30 NOTE — PROGRESS NOTES
Pt arrived on unit at [place] [address] via wheelchair with clau davey. Writer and eric davey performed skin and contraband check and both were negative.
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
## 2018-10-31 LAB — URINE CULTURE, ROUTINE: NORMAL

## 2018-10-31 PROCEDURE — 1240000000 HC EMOTIONAL WELLNESS R&B

## 2018-10-31 PROCEDURE — 99232 SBSQ HOSP IP/OBS MODERATE 35: CPT | Performed by: PSYCHIATRY & NEUROLOGY

## 2018-10-31 PROCEDURE — 93010 ELECTROCARDIOGRAM REPORT: CPT | Performed by: INTERNAL MEDICINE

## 2018-10-31 PROCEDURE — 6370000000 HC RX 637 (ALT 250 FOR IP): Performed by: PSYCHIATRY & NEUROLOGY

## 2018-10-31 RX ADMIN — LURASIDONE HYDROCHLORIDE 20 MG: 20 TABLET, FILM COATED ORAL at 08:35

## 2018-10-31 RX ADMIN — HYDROXYZINE PAMOATE 50 MG: 50 CAPSULE ORAL at 21:28

## 2018-10-31 RX ADMIN — TRAZODONE HYDROCHLORIDE 50 MG: 50 TABLET ORAL at 21:28

## 2018-10-31 RX ADMIN — OXCARBAZEPINE 150 MG: 150 TABLET, FILM COATED ORAL at 21:28

## 2018-10-31 RX ADMIN — OXCARBAZEPINE 150 MG: 150 TABLET, FILM COATED ORAL at 08:35

## 2018-10-31 RX ADMIN — ALUMINUM HYDROXIDE, MAGNESIUM HYDROXIDE, AND SIMETHICONE 30 ML: 200; 200; 20 SUSPENSION ORAL at 18:24

## 2018-10-31 NOTE — CARE COORDINATION
Pt. Calm, cooperative, and pleasant. Denies all. Reports feeling better. Denies depression and anxiety. Difficult time falling asleep, but sleeps well.   Bright and social.

## 2018-10-31 NOTE — PLAN OF CARE
Problem: Altered Mood, Depressive Behavior:  Goal: Able to verbalize acceptance of life and situations over which he or she has no control  Able to verbalize acceptance of life and situations over which he or she has no control   Outcome: Ongoing    Goal: Able to verbalize and/or display a decrease in depressive symptoms  Able to verbalize and/or display a decrease in depressive symptoms   Outcome: Ongoing    Goal: Ability to disclose and discuss suicidal ideas will improve  Ability to disclose and discuss suicidal ideas will improve   Outcome: Ongoing    Goal: Able to verbalize support systems  Able to verbalize support systems   Outcome: Ongoing    Goal: Absence of self-harm  Absence of self-harm   Outcome: Ongoing    Goal: Participates in care planning  Participates in care planning   Outcome: Ongoing

## 2018-11-01 PROCEDURE — 1240000000 HC EMOTIONAL WELLNESS R&B

## 2018-11-01 PROCEDURE — 6370000000 HC RX 637 (ALT 250 FOR IP): Performed by: PSYCHIATRY & NEUROLOGY

## 2018-11-01 PROCEDURE — 99231 SBSQ HOSP IP/OBS SF/LOW 25: CPT | Performed by: PSYCHIATRY & NEUROLOGY

## 2018-11-01 PROCEDURE — 90833 PSYTX W PT W E/M 30 MIN: CPT | Performed by: PSYCHIATRY & NEUROLOGY

## 2018-11-01 RX ADMIN — HYDROXYZINE PAMOATE 50 MG: 50 CAPSULE ORAL at 20:08

## 2018-11-01 RX ADMIN — OXCARBAZEPINE 150 MG: 150 TABLET, FILM COATED ORAL at 20:08

## 2018-11-01 RX ADMIN — OXCARBAZEPINE 150 MG: 150 TABLET, FILM COATED ORAL at 08:35

## 2018-11-01 RX ADMIN — LURASIDONE HYDROCHLORIDE 20 MG: 20 TABLET, FILM COATED ORAL at 17:13

## 2018-11-01 RX ADMIN — TRAZODONE HYDROCHLORIDE 50 MG: 50 TABLET ORAL at 20:08

## 2018-11-01 ASSESSMENT — LIFESTYLE VARIABLES: HISTORY_ALCOHOL_USE: NO

## 2018-11-01 NOTE — PROGRESS NOTES
[facility] FOLLOW-UP NOTE     11/1/2018     Patient was seen and examined in person, Chart reviewed   Patient's case discussed with staff/team    Chief Complaint: depression, SI    Interim History:     Pt doing better  Less depressed  No active SI/HI  No AVH or paranoid thoughts  Tolerating medication better  Appetite:   [x] Normal/Unchanged  [] Increased  [] Decreased      Sleep:       [] Normal/Unchanged  [x] Fair       [] Poor              Energy:    [x] Normal/Unchanged  [] Increased  [] Decreased        SI [] Present  [x] Absent    HI  []Present  [x] Absent     Aggression:  [] yes  [x] no    Patient is [x] able  [] unable to CONTRACT FOR SAFETY     PAST MEDICAL/PSYCHIATRIC HISTORY:   Past Medical History:   Diagnosis Date    Depression     PTSD (post-traumatic stress disorder)     Seizures ([address]     as a child last seizure 14years old       FAMILY/SOCIAL HISTORY:  Family History   Problem Relation Age of Onset    No Known Problems Mother         mental illness    No Known Problems Father      Social History     Social History    Marital status: Single     Spouse name: N/A    Number of children: N/A    Years of education: N/A     Occupational History    Not on file. Social History Main Topics    Smoking status: Never Smoker    Smokeless tobacco: Never Used    Alcohol use No    Drug use: No    Sexual activity: No     Other Topics Concern    Not on file     Social History Narrative    No narrative on file           ROS:  [x] All negative/unchanged except if checked.  Explain positive(checked items) below:  [] Constitutional  [] Eyes  [] Ear/Nose/Mouth/Throat  [] Respiratory  [] CV  [] GI  []   [] Musculoskeletal  [] Skin/Breast  [] Neurological  [] Endocrine  [] Heme/Lymph  [] Allergic/Immunologic    Explanation:     MEDICATIONS:    Current Facility-Administered Medications:     lurasidone (LATUDA) tablet 20 mg, 20 mg, Oral, Dinner, Cat MD Jenifer    OXcarbazepine (TRILEPTAL) tablet 150 mg, 150 mg, Oral, BID, Higinio Shah MD, 150 mg at 11/01/18 0835    hydrOXYzine (VISTARIL) capsule 50 mg, 50 mg, Oral, Q6H PRN, Faisal Kahn MD, 50 mg at 10/31/18 2128    haloperidol lactate (HALDOL) injection 5 mg, 5 mg, Intramuscular, Q4H PRN, Faisal Kahn MD    traZODone (DESYREL) tablet 50 mg, 50 mg, Oral, Nightly PRN, Faisal Kahn MD, 50 mg at 10/31/18 2128    benztropine mesylate (COGENTIN) injection 2 mg, 2 mg, Intramuscular, BID PRN, Faisal Kahn MD    magnesium hydroxide (MILK OF MAGNESIA) 400 MG/5ML suspension 30 mL, 30 mL, Oral, Daily PRN, Faisal Kahn MD    aluminum & magnesium hydroxide-simethicone (MAALOX) 317-136-17 MG/5ML suspension 30 mL, 30 mL, Oral, PRN, Faisal Kahn MD, 30 mL at 10/31/18 1824      Examination:  /76   Pulse 114   Temp 98 °F (36.7 °C) (Oral)   Resp 18   Ht 5' 7\" (1.702 m)   Wt (!) 345 lb (156.5 kg)   LMP  (LMP Unknown)   SpO2 98%   BMI 54.03 kg/m²   Gait - steady  Medication side effects(SE):     Mental Status Examination:    Level of consciousness:  within normal limits   Appearance:  fair grooming and fair hygiene  Behavior/Motor:  psychomotor retardation  Attitude toward examiner:  cooperative  Speech:  normal rate   Mood: anxious  Affect:  mood congruent  Thought processes:  linear and goal directed   Thought content:  Suicidal Ideation:  denies suicidal ideation  Delusions:  no evidence of delusions  Perceptual Disturbance:  denies any perceptual disturbance  Cognition:  oriented to person, place, and time   Concentration intact  Insight fair   Judgement good     ASSESSMENT:   Patient symptoms are:  [] Well controlled  [x] Improving  [] Worsening  [] No change      Diagnosis:   Principal Problem:    Bipolar depression ([facility]  Active Problems:    Chronic post-traumatic stress disorder (PTSD)  Resolved Problems:    * No resolved hospital problems.  *      LABS:    Recent Labs      10/29/18   2145   WBC  9.9   HGB  14.3   PLT
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
## 2018-11-01 NOTE — PROGRESS NOTES
Group Therapy Note    Date: 11/1/2018  Start Time: 1000  End Time:  1100  Number of Participants: 10    Type of Group: Psychoeducation    Wellness Binder Information  Module Name:    Session Number:      Patient's Goal:  \"to feel better\"    Notes:  Pt. attended the skill group. Status After Intervention:  Improved    Participation Level:  Active Listener and Interactive    Participation Quality: Appropriate, Attentive and Sharing      Speech:  normal      Thought Process/Content: Logical      Affective Functioning: Congruent      Mood: calm      Level of consciousness:  Alert, Oriented x4 and Attentive      Response to Learning: Progressing to goal      Endings: None Reported    Modes of Intervention: Education, Support, Socialization and Activity      Discipline Responsible: Psychoeducational Specialist      Signature:  Yari Candelaria

## 2018-11-02 VITALS
SYSTOLIC BLOOD PRESSURE: 131 MMHG | HEART RATE: 109 BPM | DIASTOLIC BLOOD PRESSURE: 88 MMHG | TEMPERATURE: 98 F | OXYGEN SATURATION: 98 % | RESPIRATION RATE: 16 BRPM | WEIGHT: 293 LBS | BODY MASS INDEX: 45.99 KG/M2 | HEIGHT: 67 IN

## 2018-11-02 PROCEDURE — 99239 HOSP IP/OBS DSCHRG MGMT >30: CPT | Performed by: PSYCHIATRY & NEUROLOGY

## 2018-11-02 PROCEDURE — 6370000000 HC RX 637 (ALT 250 FOR IP): Performed by: PSYCHIATRY & NEUROLOGY

## 2018-11-02 RX ORDER — OXCARBAZEPINE 150 MG/1
150 TABLET, FILM COATED ORAL 2 TIMES DAILY
Qty: 30 TABLET | Refills: 2 | Status: ON HOLD | OUTPATIENT
Start: 2018-11-02 | End: 2019-12-03 | Stop reason: HOSPADM

## 2018-11-02 RX ORDER — TRAZODONE HYDROCHLORIDE 50 MG/1
50 TABLET ORAL NIGHTLY PRN
Qty: 15 TABLET | Refills: 2 | Status: SHIPPED | OUTPATIENT
Start: 2018-11-02 | End: 2019-11-27

## 2018-11-02 RX ADMIN — OXCARBAZEPINE 150 MG: 150 TABLET, FILM COATED ORAL at 08:47
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
## 2018-11-02 NOTE — PROGRESS NOTES
Pt. attended the 0900 community meeting. Electronically signed by Tova Allen, [address] on 11/2/2018 at 10:55 AM

## 2018-11-02 NOTE — PLAN OF CARE
Problem: Altered Mood, Depressive Behavior:  Goal: Able to verbalize acceptance of life and situations over which he or she has no control  Able to verbalize acceptance of life and situations over which he or she has no control   Outcome: Met This Shift    Goal: Able to verbalize and/or display a decrease in depressive symptoms  Able to verbalize and/or display a decrease in depressive symptoms   Outcome: Met This Shift    Goal: Ability to disclose and discuss suicidal ideas will improve  Ability to disclose and discuss suicidal ideas will improve   Outcome: Met This Shift    Goal: Able to verbalize support systems  Able to verbalize support systems   Outcome: Met This Shift    Goal: Absence of self-harm  Absence of self-harm   Outcome: Met This Shift    Goal: Participates in care planning  Participates in care planning   Outcome: Ongoing

## 2018-11-02 NOTE — PROGRESS NOTES
Pt noted up on unit stated she is going home today , appeared sad stated she has her plans worked out, stated she is going to love her mother from [facility]. Denied suicidal thoughts, pt was informed of [facility]  Always open if she becomes overwelmed or suicidal . Denies any suicidal thoughts now and reports her meds are working for her mood.

## 2018-11-13 ENCOUNTER — APPOINTMENT (OUTPATIENT)
Dept: GENERAL RADIOLOGY | Age: 23
End: 2018-11-13
Payer: MEDICAID

## 2018-11-13 ENCOUNTER — HOSPITAL ENCOUNTER (EMERGENCY)
Age: 23
Discharge: HOME | End: 2018-11-13
Payer: MEDICAID

## 2018-11-13 VITALS
TEMPERATURE: 98.2 F | DIASTOLIC BLOOD PRESSURE: 77 MMHG | SYSTOLIC BLOOD PRESSURE: 121 MMHG | HEIGHT: 67 IN | BODY MASS INDEX: 45.99 KG/M2 | OXYGEN SATURATION: 100 % | HEART RATE: 71 BPM | WEIGHT: 293 LBS | RESPIRATION RATE: 18 BRPM

## 2018-11-13 DIAGNOSIS — S39.012A STRAIN OF LUMBAR REGION, INITIAL ENCOUNTER: Primary | ICD-10-CM

## 2018-11-13 LAB
CHP ED QC CHECK: NORMAL
PREGNANCY TEST URINE, POC: NEGATIVE

## 2018-11-13 PROCEDURE — 6360000002 HC RX W HCPCS: Performed by: PERSONAL EMERGENCY RESPONSE ATTENDANT

## 2018-11-13 PROCEDURE — 99284 EMERGENCY DEPT VISIT MOD MDM: CPT

## 2018-11-13 PROCEDURE — 72110 X-RAY EXAM L-2 SPINE 4/>VWS: CPT

## 2018-11-13 PROCEDURE — 96372 THER/PROPH/DIAG INJ SC/IM: CPT

## 2018-11-13 RX ORDER — KETOROLAC TROMETHAMINE 30 MG/ML
60 INJECTION, SOLUTION INTRAMUSCULAR; INTRAVENOUS ONCE
Status: COMPLETED | OUTPATIENT
Start: 2018-11-13 | End: 2018-11-13

## 2018-11-13 RX ORDER — NAPROXEN 500 MG/1
500 TABLET ORAL 2 TIMES DAILY WITH MEALS
Qty: 30 TABLET | Refills: 0 | Status: SHIPPED | OUTPATIENT
Start: 2018-11-13 | End: 2019-11-27

## 2018-11-13 RX ADMIN — KETOROLAC TROMETHAMINE 60 MG: 30 INJECTION, SOLUTION INTRAMUSCULAR at 22:10

## 2018-11-13 ASSESSMENT — PAIN DESCRIPTION - DESCRIPTORS: DESCRIPTORS: ACHING

## 2018-11-13 ASSESSMENT — ENCOUNTER SYMPTOMS
COLOR CHANGE: 0
DIARRHEA: 0
BLOOD IN STOOL: 0
RHINORRHEA: 0
BACK PAIN: 1
ABDOMINAL PAIN: 0
SORE THROAT: 0
COUGH: 0
NAUSEA: 0
SHORTNESS OF BREATH: 0
VOMITING: 0

## 2018-11-13 ASSESSMENT — PAIN DESCRIPTION - ONSET: ONSET: ON-GOING

## 2018-11-13 ASSESSMENT — PAIN DESCRIPTION - LOCATION: LOCATION: BACK

## 2018-11-13 ASSESSMENT — PAIN DESCRIPTION - ORIENTATION: ORIENTATION: RIGHT;LOWER

## 2018-11-13 ASSESSMENT — PAIN DESCRIPTION - PROGRESSION: CLINICAL_PROGRESSION: GRADUALLY WORSENING

## 2018-11-13 ASSESSMENT — PAIN DESCRIPTION - PAIN TYPE: TYPE: ACUTE PAIN

## 2018-11-13 ASSESSMENT — PAIN DESCRIPTION - FREQUENCY: FREQUENCY: INTERMITTENT

## 2018-11-13 ASSESSMENT — PAIN SCALES - GENERAL: PAINLEVEL_OUTOF10: 10

## 2018-11-14 NOTE — ED PROVIDER NOTES
TRAZODONE (DESYREL) 50 MG TABLET    Take 1 tablet by mouth nightly as needed for Sleep       ALLERGIES     Tylenol [acetaminophen]    FAMILY HISTORY       Family History   Problem Relation Age of Onset    No Known Problems Mother         mental illness    No Known Problems Father           SOCIAL HISTORY       Social History     Social History    Marital status: Single     Spouse name: N/A    Number of children: N/A    Years of education: N/A     Social History Main Topics    Smoking status: Never Smoker    Smokeless tobacco: Never Used    Alcohol use No    Drug use: No    Sexual activity: No     Other Topics Concern    None     Social History Narrative    None         PHYSICAL EXAM         ED Triage Vitals [11/13/18 2109]   BP Temp Temp Source Pulse Resp SpO2 Height Weight   139/82 97.8 °F (36.6 °C) Oral 78 20 100 % 5' 7\" (1.702 m) (!) 347 lb (157.4 kg)       Physical Exam   Constitutional: She is oriented to person, place, and time. She appears well-developed and well-nourished. HENT:   Head: Normocephalic and atraumatic. Mouth/Throat: Oropharynx is clear and moist.   Eyes: Pupils are equal, round, and reactive to light. Conjunctivae and EOM are normal.   Neck: Normal range of motion. Neck supple. No tracheal deviation present. Cardiovascular: Normal heart sounds and intact distal pulses. Pulmonary/Chest: Effort normal and breath sounds normal. No stridor. No respiratory distress. Abdominal: Soft. Bowel sounds are normal. She exhibits no distension and no mass. There is no tenderness. There is no rebound and no guarding. Musculoskeletal: Normal range of motion. She exhibits tenderness. Back:    No obvious right lumbar back tenderness to palpation. No midline spinous process tenderness   Neurological: She is alert and oriented to person, place, and time. She has normal reflexes. Skin: Skin is warm and dry. Capillary refill takes less than 2 seconds. No rash noted.    Psychiatric:

## 2018-11-14 NOTE — ED NOTES
Bed: 06  Expected date:   Expected time:   Means of arrival:   Comments:     Farheen Pérez RN  11/13/18 6324

## 2018-11-14 NOTE — ED NOTES
Patient returned from x-ray. No distress noted. Hawk RAO @ bedside.       Edna Hein RN  11/13/18 7761

## 2019-02-20 ENCOUNTER — HOSPITAL ENCOUNTER (EMERGENCY)
Age: 24
Discharge: HOME | End: 2019-02-20
Payer: MEDICAID

## 2019-02-20 ENCOUNTER — APPOINTMENT (OUTPATIENT)
Dept: GENERAL RADIOLOGY | Age: 24
End: 2019-02-20
Payer: MEDICAID

## 2019-02-20 VITALS
HEIGHT: 67 IN | TEMPERATURE: 98.7 F | DIASTOLIC BLOOD PRESSURE: 60 MMHG | SYSTOLIC BLOOD PRESSURE: 110 MMHG | BODY MASS INDEX: 45.99 KG/M2 | HEART RATE: 97 BPM | RESPIRATION RATE: 18 BRPM | WEIGHT: 293 LBS | OXYGEN SATURATION: 96 %

## 2019-02-20 DIAGNOSIS — M25.561 ACUTE PAIN OF BOTH KNEES: Primary | ICD-10-CM

## 2019-02-20 DIAGNOSIS — M25.562 ACUTE PAIN OF BOTH KNEES: Primary | ICD-10-CM

## 2019-02-20 PROCEDURE — 73562 X-RAY EXAM OF KNEE 3: CPT

## 2019-02-20 PROCEDURE — 6370000000 HC RX 637 (ALT 250 FOR IP): Performed by: PERSONAL EMERGENCY RESPONSE ATTENDANT

## 2019-02-20 PROCEDURE — 99283 EMERGENCY DEPT VISIT LOW MDM: CPT

## 2019-02-20 RX ORDER — IBUPROFEN 800 MG/1
800 TABLET ORAL ONCE
Status: COMPLETED | OUTPATIENT
Start: 2019-02-20 | End: 2019-02-20

## 2019-02-20 RX ADMIN — IBUPROFEN 800 MG: 800 TABLET, FILM COATED ORAL at 04:00

## 2019-02-20 ASSESSMENT — ENCOUNTER SYMPTOMS
COUGH: 0
RHINORRHEA: 0
VOMITING: 0
COLOR CHANGE: 0
SORE THROAT: 0
NAUSEA: 0
DIARRHEA: 0
ABDOMINAL PAIN: 0
BLOOD IN STOOL: 0
SHORTNESS OF BREATH: 0

## 2019-02-20 ASSESSMENT — PAIN DESCRIPTION - FREQUENCY: FREQUENCY: CONTINUOUS

## 2019-02-20 ASSESSMENT — PAIN SCALES - GENERAL: PAINLEVEL_OUTOF10: 8

## 2019-02-20 ASSESSMENT — PAIN DESCRIPTION - ORIENTATION: ORIENTATION: RIGHT;LEFT

## 2019-02-20 ASSESSMENT — PAIN DESCRIPTION - LOCATION: LOCATION: KNEE

## 2019-02-20 ASSESSMENT — PAIN DESCRIPTION - DESCRIPTORS: DESCRIPTORS: ACHING

## 2019-02-20 ASSESSMENT — PAIN DESCRIPTION - PAIN TYPE: TYPE: ACUTE PAIN

## 2019-11-27 ENCOUNTER — HOSPITAL ENCOUNTER (INPATIENT)
Age: 24
LOS: 6 days | Discharge: HOME | DRG: 753 | End: 2019-12-03
Attending: PSYCHIATRY & NEUROLOGY | Admitting: PSYCHIATRY & NEUROLOGY
Payer: MEDICAID

## 2019-11-27 DIAGNOSIS — F31.4 BIPOLAR I DISORDER, MOST RECENT EPISODE DEPRESSED, SEVERE WITHOUT PSYCHOTIC FEATURES (HCC): Primary | ICD-10-CM

## 2019-11-27 LAB
ACETAMINOPHEN LEVEL: <5 UG/ML (ref 10–30)
ALBUMIN SERPL-MCNC: 4.2 G/DL (ref 3.5–4.6)
ALP BLD-CCNC: 125 U/L (ref 40–130)
ALT SERPL-CCNC: 20 U/L (ref 0–33)
AMPHETAMINE SCREEN, URINE: NORMAL
ANION GAP SERPL CALCULATED.3IONS-SCNC: 13 MEQ/L (ref 9–15)
AST SERPL-CCNC: 17 U/L (ref 0–35)
BACTERIA: ABNORMAL /HPF
BARBITURATE SCREEN URINE: NORMAL
BASOPHILS ABSOLUTE: 0.1 K/UL (ref 0–0.2)
BASOPHILS RELATIVE PERCENT: 0.7 %
BENZODIAZEPINE SCREEN, URINE: NORMAL
BILIRUB SERPL-MCNC: 0.3 MG/DL (ref 0.2–0.7)
BILIRUBIN URINE: NEGATIVE
BLOOD, URINE: NEGATIVE
BUN BLDV-MCNC: 8 MG/DL (ref 6–20)
CALCIUM SERPL-MCNC: 9.5 MG/DL (ref 8.5–9.9)
CANNABINOID SCREEN URINE: NORMAL
CHLORIDE BLD-SCNC: 104 MEQ/L (ref 95–107)
CK MB: 1.3 NG/ML (ref 0–3.8)
CLARITY: CLEAR
CO2: 22 MEQ/L (ref 20–31)
COCAINE METABOLITE SCREEN URINE: NORMAL
COLOR: YELLOW
CREAT SERPL-MCNC: 0.59 MG/DL (ref 0.5–0.9)
CREATINE KINASE-MB INDEX: 0.6 % (ref 0–3.5)
EOSINOPHILS ABSOLUTE: 0.2 K/UL (ref 0–0.7)
EOSINOPHILS RELATIVE PERCENT: 2.1 %
EPITHELIAL CELLS, UA: ABNORMAL /HPF (ref 0–5)
ETHANOL PERCENT: NORMAL G/DL
ETHANOL: <10 MG/DL (ref 0–0.08)
GFR AFRICAN AMERICAN: >60
GFR NON-AFRICAN AMERICAN: >60
GLOBULIN: 4.7 G/DL (ref 2.3–3.5)
GLUCOSE BLD-MCNC: 129 MG/DL (ref 70–99)
GLUCOSE URINE: 100 MG/DL
HCG QUALITATIVE: NEGATIVE
HCT VFR BLD CALC: 46 % (ref 37–47)
HEMOGLOBIN: 15.3 G/DL (ref 12–16)
HYALINE CASTS: ABNORMAL /HPF (ref 0–5)
KETONES, URINE: NEGATIVE MG/DL
LEUKOCYTE ESTERASE, URINE: NEGATIVE
LYMPHOCYTES ABSOLUTE: 2.8 K/UL (ref 1–4.8)
LYMPHOCYTES RELATIVE PERCENT: 34.6 %
Lab: NORMAL
MCH RBC QN AUTO: 27.2 PG (ref 27–31.3)
MCHC RBC AUTO-ENTMCNC: 33.2 % (ref 33–37)
MCV RBC AUTO: 81.9 FL (ref 82–100)
METHADONE SCREEN, URINE: NORMAL
MONOCYTES ABSOLUTE: 0.6 K/UL (ref 0.2–0.8)
MONOCYTES RELATIVE PERCENT: 7.6 %
NEUTROPHILS ABSOLUTE: 4.5 K/UL (ref 1.4–6.5)
NEUTROPHILS RELATIVE PERCENT: 55 %
NITRITE, URINE: NEGATIVE
OPIATE SCREEN URINE: NORMAL
OXYCODONE URINE: NORMAL
PDW BLD-RTO: 14 % (ref 11.5–14.5)
PH UA: 6.5 (ref 5–9)
PHENCYCLIDINE SCREEN URINE: NORMAL
PLATELET # BLD: 261 K/UL (ref 130–400)
POTASSIUM SERPL-SCNC: 3.6 MEQ/L (ref 3.4–4.9)
PROPOXYPHENE SCREEN: NORMAL
PROTEIN UA: 30 MG/DL
RBC # BLD: 5.61 M/UL (ref 4.2–5.4)
RBC UA: ABNORMAL /HPF (ref 0–5)
SALICYLATE, SERUM: <0.3 MG/DL (ref 15–30)
SODIUM BLD-SCNC: 139 MEQ/L (ref 135–144)
SPECIFIC GRAVITY UA: 1.02 (ref 1–1.03)
TOTAL CK: 224 U/L (ref 0–170)
TOTAL PROTEIN: 8.9 G/DL (ref 6.3–8)
TSH SERPL DL<=0.05 MIU/L-ACNC: 1.43 UIU/ML (ref 0.44–3.86)
URINE REFLEX TO CULTURE: YES
UROBILINOGEN, URINE: 0.2 E.U./DL
WBC # BLD: 8.2 K/UL (ref 4.8–10.8)
WBC UA: ABNORMAL /HPF (ref 0–5)

## 2019-11-27 PROCEDURE — G0480 DRUG TEST DEF 1-7 CLASSES: HCPCS

## 2019-11-27 PROCEDURE — 84443 ASSAY THYROID STIM HORMONE: CPT

## 2019-11-27 PROCEDURE — 1240000000 HC EMOTIONAL WELLNESS R&B

## 2019-11-27 PROCEDURE — 36415 COLL VENOUS BLD VENIPUNCTURE: CPT

## 2019-11-27 PROCEDURE — 80307 DRUG TEST PRSMV CHEM ANLYZR: CPT

## 2019-11-27 PROCEDURE — 6370000000 HC RX 637 (ALT 250 FOR IP): Performed by: PSYCHIATRY & NEUROLOGY

## 2019-11-27 PROCEDURE — 6370000000 HC RX 637 (ALT 250 FOR IP): Performed by: NURSE PRACTITIONER

## 2019-11-27 PROCEDURE — 81001 URINALYSIS AUTO W/SCOPE: CPT

## 2019-11-27 PROCEDURE — 93005 ELECTROCARDIOGRAM TRACING: CPT | Performed by: NURSE PRACTITIONER

## 2019-11-27 PROCEDURE — 82550 ASSAY OF CK (CPK): CPT

## 2019-11-27 PROCEDURE — 85025 COMPLETE CBC W/AUTO DIFF WBC: CPT

## 2019-11-27 PROCEDURE — 82553 CREATINE MB FRACTION: CPT

## 2019-11-27 PROCEDURE — 99285 EMERGENCY DEPT VISIT HI MDM: CPT

## 2019-11-27 PROCEDURE — 84703 CHORIONIC GONADOTROPIN ASSAY: CPT

## 2019-11-27 PROCEDURE — 80053 COMPREHEN METABOLIC PANEL: CPT

## 2019-11-27 PROCEDURE — 87086 URINE CULTURE/COLONY COUNT: CPT

## 2019-11-27 RX ORDER — 0.9 % SODIUM CHLORIDE 0.9 %
1000 INTRAVENOUS SOLUTION INTRAVENOUS ONCE
Status: DISCONTINUED | OUTPATIENT
Start: 2019-11-27 | End: 2019-11-27

## 2019-11-27 RX ORDER — IBUPROFEN 800 MG/1
800 TABLET ORAL ONCE
Status: COMPLETED | OUTPATIENT
Start: 2019-11-27 | End: 2019-11-27

## 2019-11-27 RX ORDER — MEDROXYPROGESTERONE ACETATE 150 MG/ML
150 INJECTION, SUSPENSION INTRAMUSCULAR
COMMUNITY

## 2019-11-27 RX ORDER — BENZTROPINE MESYLATE 1 MG/ML
2 INJECTION INTRAMUSCULAR; INTRAVENOUS 2 TIMES DAILY PRN
Status: DISCONTINUED | OUTPATIENT
Start: 2019-11-27 | End: 2019-12-03 | Stop reason: HOSPADM

## 2019-11-27 RX ORDER — TRAZODONE HYDROCHLORIDE 50 MG/1
50 TABLET ORAL NIGHTLY PRN
Status: DISCONTINUED | OUTPATIENT
Start: 2019-11-27 | End: 2019-11-29

## 2019-11-27 RX ORDER — IBUPROFEN 400 MG/1
400 TABLET ORAL EVERY 4 HOURS PRN
Status: DISCONTINUED | OUTPATIENT
Start: 2019-11-27 | End: 2019-12-03 | Stop reason: HOSPADM

## 2019-11-27 RX ADMIN — TRAZODONE HYDROCHLORIDE 50 MG: 50 TABLET ORAL at 21:48

## 2019-11-27 RX ADMIN — IBUPROFEN 800 MG: 800 TABLET, FILM COATED ORAL at 18:58

## 2019-11-27 ASSESSMENT — ENCOUNTER SYMPTOMS
EYE DISCHARGE: 0
ABDOMINAL PAIN: 0
VOMITING: 0
DIARRHEA: 0
EYE REDNESS: 0
COLOR CHANGE: 0
BACK PAIN: 0
SHORTNESS OF BREATH: 0
CONSTIPATION: 0
COUGH: 0
SORE THROAT: 0
WHEEZING: 0
EYE PAIN: 0
NAUSEA: 0
RHINORRHEA: 0
TROUBLE SWALLOWING: 0
BLOOD IN STOOL: 0

## 2019-11-27 ASSESSMENT — SLEEP AND FATIGUE QUESTIONNAIRES
SLEEP PATTERN: DISTURBED/INTERRUPTED SLEEP;EARLY AWAKENING
DIFFICULTY FALLING ASLEEP: NO
RESTFUL SLEEP: NO
DO YOU HAVE DIFFICULTY SLEEPING: YES
AVERAGE NUMBER OF SLEEP HOURS: 3
DIFFICULTY ARISING: NO
DO YOU USE A SLEEP AID: NO
DIFFICULTY STAYING ASLEEP: YES

## 2019-11-27 ASSESSMENT — PAIN SCALES - GENERAL: PAINLEVEL_OUTOF10: 5

## 2019-11-28 PROCEDURE — 6370000000 HC RX 637 (ALT 250 FOR IP): Performed by: NURSE PRACTITIONER

## 2019-11-28 PROCEDURE — 6370000000 HC RX 637 (ALT 250 FOR IP): Performed by: PSYCHIATRY & NEUROLOGY

## 2019-11-28 PROCEDURE — 90686 IIV4 VACC NO PRSV 0.5 ML IM: CPT | Performed by: PSYCHIATRY & NEUROLOGY

## 2019-11-28 PROCEDURE — G0008 ADMIN INFLUENZA VIRUS VAC: HCPCS | Performed by: PSYCHIATRY & NEUROLOGY

## 2019-11-28 PROCEDURE — 1240000000 HC EMOTIONAL WELLNESS R&B

## 2019-11-28 PROCEDURE — 6360000002 HC RX W HCPCS: Performed by: PSYCHIATRY & NEUROLOGY

## 2019-11-28 RX ORDER — PSEUDOEPHEDRINE HCL 120 MG/1
120 TABLET, FILM COATED, EXTENDED RELEASE ORAL 2 TIMES DAILY
Status: COMPLETED | OUTPATIENT
Start: 2019-11-28 | End: 2019-11-30

## 2019-11-28 RX ORDER — LORAZEPAM 1 MG/1
1 TABLET ORAL ONCE
Status: COMPLETED | OUTPATIENT
Start: 2019-11-28 | End: 2019-11-28

## 2019-11-28 RX ORDER — OXCARBAZEPINE 300 MG/1
300 TABLET, FILM COATED ORAL 2 TIMES DAILY
Status: DISCONTINUED | OUTPATIENT
Start: 2019-11-28 | End: 2019-12-03 | Stop reason: HOSPADM

## 2019-11-28 RX ORDER — ARIPIPRAZOLE 5 MG/1
5 TABLET ORAL DAILY
Status: DISCONTINUED | OUTPATIENT
Start: 2019-11-28 | End: 2019-12-03 | Stop reason: HOSPADM

## 2019-11-28 RX ADMIN — INFLUENZA A VIRUS A/BRISBANE/02/2018 IVR-190 (H1N1) ANTIGEN (PROPIOLACTONE INACTIVATED), INFLUENZA A VIRUS A/KANSAS/14/2017 X-327 (H3N2) ANTIGEN (PROPIOLACTONE INACTIVATED), INFLUENZA B VIRUS B/MARYLAND/15/2016 ANTIGEN (PROPIOLACTONE INACTIVATED), INFLUENZA B VIRUS B/PHUKET/3073/2013 BVR-1B ANTIGEN (PROPIOLACTONE INACTIVATED) 0.5 ML: 15; 15; 15; 15 INJECTION, SUSPENSION INTRAMUSCULAR at 20:45

## 2019-11-28 RX ADMIN — ARIPIPRAZOLE 5 MG: 5 TABLET ORAL at 09:40

## 2019-11-28 RX ADMIN — OXCARBAZEPINE 300 MG: 300 TABLET, FILM COATED ORAL at 09:40

## 2019-11-28 RX ADMIN — TRAZODONE HYDROCHLORIDE 50 MG: 50 TABLET ORAL at 20:59

## 2019-11-28 RX ADMIN — LORAZEPAM 1 MG: 1 TABLET ORAL at 09:40

## 2019-11-28 RX ADMIN — OXCARBAZEPINE 300 MG: 300 TABLET, FILM COATED ORAL at 20:45

## 2019-11-28 RX ADMIN — PSEUDOEPHEDRINE HCL 120 MG: 120 TABLET, FILM COATED, EXTENDED RELEASE ORAL at 14:40

## 2019-11-28 RX ADMIN — PSEUDOEPHEDRINE HCL 120 MG: 120 TABLET, FILM COATED, EXTENDED RELEASE ORAL at 20:44

## 2019-11-28 ASSESSMENT — LIFESTYLE VARIABLES: HISTORY_ALCOHOL_USE: NO

## 2019-11-28 ASSESSMENT — PAIN SCALES - GENERAL: PAINLEVEL_OUTOF10: 2

## 2019-11-29 LAB — URINE CULTURE, ROUTINE: NORMAL

## 2019-11-29 PROCEDURE — 93010 ELECTROCARDIOGRAM REPORT: CPT | Performed by: INTERNAL MEDICINE

## 2019-11-29 PROCEDURE — 6370000000 HC RX 637 (ALT 250 FOR IP): Performed by: PSYCHIATRY & NEUROLOGY

## 2019-11-29 PROCEDURE — 6370000000 HC RX 637 (ALT 250 FOR IP): Performed by: NURSE PRACTITIONER

## 2019-11-29 PROCEDURE — 99232 SBSQ HOSP IP/OBS MODERATE 35: CPT | Performed by: PSYCHIATRY & NEUROLOGY

## 2019-11-29 PROCEDURE — 1240000000 HC EMOTIONAL WELLNESS R&B

## 2019-11-29 RX ORDER — HALOPERIDOL 5 MG
5 TABLET ORAL EVERY 6 HOURS PRN
Status: DISCONTINUED | OUTPATIENT
Start: 2019-11-29 | End: 2019-12-03 | Stop reason: HOSPADM

## 2019-11-29 RX ORDER — HALOPERIDOL 5 MG/ML
5 INJECTION INTRAMUSCULAR EVERY 6 HOURS PRN
Status: DISCONTINUED | OUTPATIENT
Start: 2019-11-29 | End: 2019-12-03 | Stop reason: HOSPADM

## 2019-11-29 RX ORDER — TRAZODONE HYDROCHLORIDE 150 MG/1
75 TABLET ORAL NIGHTLY PRN
Status: DISCONTINUED | OUTPATIENT
Start: 2019-11-29 | End: 2019-12-03 | Stop reason: HOSPADM

## 2019-11-29 RX ORDER — HYDROXYZINE HYDROCHLORIDE 50 MG/ML
50 INJECTION, SOLUTION INTRAMUSCULAR EVERY 6 HOURS PRN
Status: DISCONTINUED | OUTPATIENT
Start: 2019-11-29 | End: 2019-12-03 | Stop reason: HOSPADM

## 2019-11-29 RX ORDER — HYDROXYZINE PAMOATE 50 MG/1
50 CAPSULE ORAL EVERY 6 HOURS PRN
Status: DISCONTINUED | OUTPATIENT
Start: 2019-11-29 | End: 2019-12-03 | Stop reason: HOSPADM

## 2019-11-29 RX ADMIN — OXCARBAZEPINE 300 MG: 300 TABLET, FILM COATED ORAL at 08:15

## 2019-11-29 RX ADMIN — OXCARBAZEPINE 300 MG: 300 TABLET, FILM COATED ORAL at 20:32

## 2019-11-29 RX ADMIN — PSEUDOEPHEDRINE HCL 120 MG: 120 TABLET, FILM COATED, EXTENDED RELEASE ORAL at 20:31

## 2019-11-29 RX ADMIN — PSEUDOEPHEDRINE HCL 120 MG: 120 TABLET, FILM COATED, EXTENDED RELEASE ORAL at 08:15

## 2019-11-29 RX ADMIN — ARIPIPRAZOLE 5 MG: 5 TABLET ORAL at 08:15

## 2019-11-29 RX ADMIN — TRAZODONE HYDROCHLORIDE 75 MG: 150 TABLET ORAL at 20:32

## 2019-11-30 PROCEDURE — 1240000000 HC EMOTIONAL WELLNESS R&B

## 2019-11-30 PROCEDURE — 6370000000 HC RX 637 (ALT 250 FOR IP): Performed by: PSYCHIATRY & NEUROLOGY

## 2019-11-30 PROCEDURE — 6370000000 HC RX 637 (ALT 250 FOR IP): Performed by: NURSE PRACTITIONER

## 2019-11-30 RX ADMIN — OXCARBAZEPINE 300 MG: 300 TABLET, FILM COATED ORAL at 09:00

## 2019-11-30 RX ADMIN — ARIPIPRAZOLE 5 MG: 5 TABLET ORAL at 09:00

## 2019-11-30 RX ADMIN — PSEUDOEPHEDRINE HCL 120 MG: 120 TABLET, FILM COATED, EXTENDED RELEASE ORAL at 09:00

## 2019-11-30 RX ADMIN — OXCARBAZEPINE 300 MG: 300 TABLET, FILM COATED ORAL at 20:30

## 2019-11-30 RX ADMIN — TRAZODONE HYDROCHLORIDE 75 MG: 150 TABLET ORAL at 22:16

## 2019-12-01 LAB
EKG ATRIAL RATE: 107 BPM
EKG ATRIAL RATE: 114 BPM
EKG P AXIS: 53 DEGREES
EKG P AXIS: 58 DEGREES
EKG P-R INTERVAL: 120 MS
EKG P-R INTERVAL: 134 MS
EKG Q-T INTERVAL: 326 MS
EKG Q-T INTERVAL: 348 MS
EKG QRS DURATION: 70 MS
EKG QRS DURATION: 80 MS
EKG QTC CALCULATION (BAZETT): 449 MS
EKG QTC CALCULATION (BAZETT): 464 MS
EKG R AXIS: 20 DEGREES
EKG R AXIS: 40 DEGREES
EKG T AXIS: 21 DEGREES
EKG T AXIS: 49 DEGREES
EKG VENTRICULAR RATE: 107 BPM
EKG VENTRICULAR RATE: 114 BPM

## 2019-12-01 PROCEDURE — 6370000000 HC RX 637 (ALT 250 FOR IP): Performed by: PSYCHIATRY & NEUROLOGY

## 2019-12-01 PROCEDURE — 1240000000 HC EMOTIONAL WELLNESS R&B

## 2019-12-01 PROCEDURE — 93005 ELECTROCARDIOGRAM TRACING: CPT | Performed by: NURSE PRACTITIONER

## 2019-12-01 RX ADMIN — OXCARBAZEPINE 300 MG: 300 TABLET, FILM COATED ORAL at 08:54

## 2019-12-01 RX ADMIN — ARIPIPRAZOLE 5 MG: 5 TABLET ORAL at 08:54

## 2019-12-01 RX ADMIN — OXCARBAZEPINE 300 MG: 300 TABLET, FILM COATED ORAL at 21:43

## 2019-12-01 ASSESSMENT — ENCOUNTER SYMPTOMS
RESPIRATORY NEGATIVE: 1
GASTROINTESTINAL NEGATIVE: 1
EYES NEGATIVE: 1

## 2019-12-02 PROCEDURE — 99231 SBSQ HOSP IP/OBS SF/LOW 25: CPT | Performed by: PSYCHIATRY & NEUROLOGY

## 2019-12-02 PROCEDURE — APPNB45 APP NON BILLABLE 31-45 MINUTES: Performed by: PHYSICIAN ASSISTANT

## 2019-12-02 PROCEDURE — 6370000000 HC RX 637 (ALT 250 FOR IP): Performed by: PSYCHIATRY & NEUROLOGY

## 2019-12-02 PROCEDURE — 1240000000 HC EMOTIONAL WELLNESS R&B

## 2019-12-02 PROCEDURE — 6370000000 HC RX 637 (ALT 250 FOR IP): Performed by: PHYSICIAN ASSISTANT

## 2019-12-02 PROCEDURE — 99253 IP/OBS CNSLTJ NEW/EST LOW 45: CPT | Performed by: INTERNAL MEDICINE

## 2019-12-02 PROCEDURE — 90833 PSYTX W PT W E/M 30 MIN: CPT | Performed by: PSYCHIATRY & NEUROLOGY

## 2019-12-02 RX ADMIN — TRAZODONE HYDROCHLORIDE 75 MG: 150 TABLET ORAL at 20:43

## 2019-12-02 RX ADMIN — METOPROLOL TARTRATE 25 MG: 25 TABLET ORAL at 20:42

## 2019-12-02 RX ADMIN — ARIPIPRAZOLE 5 MG: 5 TABLET ORAL at 09:16

## 2019-12-02 RX ADMIN — OXCARBAZEPINE 300 MG: 300 TABLET, FILM COATED ORAL at 09:16

## 2019-12-02 RX ADMIN — OXCARBAZEPINE 300 MG: 300 TABLET, FILM COATED ORAL at 20:42

## 2019-12-02 ASSESSMENT — ENCOUNTER SYMPTOMS
CHEST TIGHTNESS: 0
VOMITING: 0
NAUSEA: 0
COLOR CHANGE: 0
SHORTNESS OF BREATH: 1

## 2019-12-03 VITALS
SYSTOLIC BLOOD PRESSURE: 140 MMHG | HEART RATE: 111 BPM | HEIGHT: 67 IN | BODY MASS INDEX: 45.99 KG/M2 | RESPIRATION RATE: 18 BRPM | OXYGEN SATURATION: 95 % | TEMPERATURE: 98 F | DIASTOLIC BLOOD PRESSURE: 82 MMHG | WEIGHT: 293 LBS

## 2019-12-03 PROCEDURE — 93010 ELECTROCARDIOGRAM REPORT: CPT | Performed by: INTERNAL MEDICINE

## 2019-12-03 PROCEDURE — 6370000000 HC RX 637 (ALT 250 FOR IP): Performed by: PSYCHIATRY & NEUROLOGY

## 2019-12-03 PROCEDURE — 99239 HOSP IP/OBS DSCHRG MGMT >30: CPT | Performed by: PSYCHIATRY & NEUROLOGY

## 2019-12-03 PROCEDURE — 6370000000 HC RX 637 (ALT 250 FOR IP): Performed by: PHYSICIAN ASSISTANT

## 2019-12-03 RX ORDER — ARIPIPRAZOLE 5 MG/1
5 TABLET ORAL DAILY
Qty: 15 TABLET | Refills: 2 | Status: SHIPPED | OUTPATIENT
Start: 2019-12-04 | End: 2020-10-10

## 2019-12-03 RX ORDER — OXCARBAZEPINE 300 MG/1
300 TABLET, FILM COATED ORAL 2 TIMES DAILY
Qty: 30 TABLET | Refills: 2 | Status: SHIPPED | OUTPATIENT
Start: 2019-12-03 | End: 2020-10-10

## 2019-12-03 RX ORDER — TRAZODONE HYDROCHLORIDE 150 MG/1
75 TABLET ORAL NIGHTLY PRN
Qty: 30 TABLET | Refills: 1 | Status: SHIPPED | OUTPATIENT
Start: 2019-12-03 | End: 2020-10-10

## 2019-12-03 RX ADMIN — ARIPIPRAZOLE 5 MG: 5 TABLET ORAL at 08:42

## 2019-12-03 RX ADMIN — OXCARBAZEPINE 300 MG: 300 TABLET, FILM COATED ORAL at 08:42

## 2019-12-03 RX ADMIN — METOPROLOL TARTRATE 25 MG: 25 TABLET ORAL at 08:42

## 2020-06-05 ENCOUNTER — APPOINTMENT (OUTPATIENT)
Dept: GENERAL RADIOLOGY | Age: 25
End: 2020-06-05
Payer: MEDICAID

## 2020-06-05 ENCOUNTER — HOSPITAL ENCOUNTER (EMERGENCY)
Age: 25
Discharge: HOME | End: 2020-06-05
Attending: EMERGENCY MEDICINE
Payer: MEDICAID

## 2020-06-05 VITALS
BODY MASS INDEX: 44.41 KG/M2 | RESPIRATION RATE: 20 BRPM | DIASTOLIC BLOOD PRESSURE: 82 MMHG | TEMPERATURE: 98.2 F | OXYGEN SATURATION: 98 % | HEART RATE: 110 BPM | WEIGHT: 293 LBS | HEIGHT: 68 IN | SYSTOLIC BLOOD PRESSURE: 149 MMHG

## 2020-06-05 LAB — HCG QUALITATIVE: NEGATIVE

## 2020-06-05 PROCEDURE — 6360000002 HC RX W HCPCS: Performed by: EMERGENCY MEDICINE

## 2020-06-05 PROCEDURE — 99283 EMERGENCY DEPT VISIT LOW MDM: CPT

## 2020-06-05 PROCEDURE — 36415 COLL VENOUS BLD VENIPUNCTURE: CPT

## 2020-06-05 PROCEDURE — 6370000000 HC RX 637 (ALT 250 FOR IP): Performed by: EMERGENCY MEDICINE

## 2020-06-05 PROCEDURE — 84703 CHORIONIC GONADOTROPIN ASSAY: CPT

## 2020-06-05 PROCEDURE — 96372 THER/PROPH/DIAG INJ SC/IM: CPT

## 2020-06-05 PROCEDURE — 73562 X-RAY EXAM OF KNEE 3: CPT

## 2020-06-05 RX ORDER — LORAZEPAM 1 MG/1
1 TABLET ORAL ONCE
Status: COMPLETED | OUTPATIENT
Start: 2020-06-05 | End: 2020-06-05

## 2020-06-05 RX ORDER — KETOROLAC TROMETHAMINE 30 MG/ML
30 INJECTION, SOLUTION INTRAMUSCULAR; INTRAVENOUS ONCE
Status: COMPLETED | OUTPATIENT
Start: 2020-06-05 | End: 2020-06-05

## 2020-06-05 RX ORDER — OXYCODONE HYDROCHLORIDE AND ACETAMINOPHEN 5; 325 MG/1; MG/1
1 TABLET ORAL ONCE
Status: COMPLETED | OUTPATIENT
Start: 2020-06-05 | End: 2020-06-05

## 2020-06-05 RX ORDER — TRAMADOL HYDROCHLORIDE 50 MG/1
50 TABLET ORAL EVERY 4 HOURS PRN
Qty: 18 TABLET | Refills: 0 | Status: SHIPPED | OUTPATIENT
Start: 2020-06-05 | End: 2020-06-08

## 2020-06-05 RX ADMIN — LORAZEPAM 1 MG: 1 TABLET ORAL at 07:50

## 2020-06-05 RX ADMIN — OXYCODONE HYDROCHLORIDE AND ACETAMINOPHEN 1 TABLET: 5; 325 TABLET ORAL at 07:50

## 2020-06-05 RX ADMIN — KETOROLAC TROMETHAMINE 30 MG: 30 INJECTION, SOLUTION INTRAMUSCULAR at 07:50

## 2020-06-05 ASSESSMENT — PAIN DESCRIPTION - PAIN TYPE: TYPE: ACUTE PAIN

## 2020-06-05 ASSESSMENT — ENCOUNTER SYMPTOMS
COUGH: 0
ABDOMINAL PAIN: 0
NAUSEA: 0
SORE THROAT: 0
VOMITING: 0
SHORTNESS OF BREATH: 0
DIARRHEA: 0
BACK PAIN: 0

## 2020-06-05 ASSESSMENT — PAIN SCALES - GENERAL
PAINLEVEL_OUTOF10: 8
PAINLEVEL_OUTOF10: 4
PAINLEVEL_OUTOF10: 8

## 2020-06-05 ASSESSMENT — PAIN DESCRIPTION - ORIENTATION: ORIENTATION: RIGHT

## 2020-06-05 ASSESSMENT — PAIN DESCRIPTION - PROGRESSION: CLINICAL_PROGRESSION: GRADUALLY IMPROVING

## 2020-06-05 ASSESSMENT — PAIN DESCRIPTION - LOCATION: LOCATION: LEG

## 2020-06-05 NOTE — ED NOTES
Patient is tearful and unsure of why she is so depressed. States she has felt like this for months with no triggering event.  notified.       Rachel Finney RN  06/05/20 5332

## 2020-06-05 NOTE — ED NOTES
Patient returned from  unable to provide sample     Rebel Woodard, [facility]  06/05/20 4180

## 2020-06-05 NOTE — ED NOTES
Lab obtained after 2 attempts using butterfly needle. Pt tolerated well.       Alea Perez, MAYDA  06/05/20 3600
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
## 2020-10-10 ENCOUNTER — HOSPITAL ENCOUNTER (INPATIENT)
Age: 25
LOS: 5 days | Discharge: HOME | DRG: 753 | End: 2020-10-15
Attending: EMERGENCY MEDICINE | Admitting: PSYCHIATRY & NEUROLOGY
Payer: MEDICAID

## 2020-10-10 LAB
ACETAMINOPHEN LEVEL: <5 UG/ML (ref 10–30)
ALBUMIN SERPL-MCNC: 4.3 G/DL (ref 3.5–4.6)
ALP BLD-CCNC: 88 U/L (ref 40–130)
ALT SERPL-CCNC: 17 U/L (ref 0–33)
AMPHETAMINE SCREEN, URINE: NORMAL
ANION GAP SERPL CALCULATED.3IONS-SCNC: 11 MEQ/L (ref 9–15)
AST SERPL-CCNC: 13 U/L (ref 0–35)
BACTERIA: ABNORMAL /HPF
BARBITURATE SCREEN URINE: NORMAL
BASOPHILS ABSOLUTE: 0.1 K/UL (ref 0–0.2)
BASOPHILS RELATIVE PERCENT: 0.8 %
BENZODIAZEPINE SCREEN, URINE: NORMAL
BILIRUB SERPL-MCNC: 0.4 MG/DL (ref 0.2–0.7)
BILIRUBIN URINE: NEGATIVE
BLOOD, URINE: NEGATIVE
BUN BLDV-MCNC: 7 MG/DL (ref 6–20)
CALCIUM SERPL-MCNC: 9.4 MG/DL (ref 8.5–9.9)
CANNABINOID SCREEN URINE: NORMAL
CHLORIDE BLD-SCNC: 102 MEQ/L (ref 95–107)
CHOLESTEROL, TOTAL: 159 MG/DL (ref 0–199)
CLARITY: CLEAR
CO2: 23 MEQ/L (ref 20–31)
COCAINE METABOLITE SCREEN URINE: NORMAL
COLOR: YELLOW
CREAT SERPL-MCNC: 0.6 MG/DL (ref 0.5–0.9)
EKG ATRIAL RATE: 99 BPM
EKG P AXIS: 61 DEGREES
EKG P-R INTERVAL: 132 MS
EKG Q-T INTERVAL: 358 MS
EKG QRS DURATION: 80 MS
EKG QTC CALCULATION (BAZETT): 459 MS
EKG R AXIS: 22 DEGREES
EKG T AXIS: 10 DEGREES
EKG VENTRICULAR RATE: 99 BPM
EOSINOPHILS ABSOLUTE: 0.1 K/UL (ref 0–0.7)
EOSINOPHILS RELATIVE PERCENT: 0.9 %
EPITHELIAL CELLS, UA: ABNORMAL /HPF (ref 0–5)
ETHANOL PERCENT: NORMAL G/DL
ETHANOL: <10 MG/DL (ref 0–0.08)
GFR AFRICAN AMERICAN: >60
GFR NON-AFRICAN AMERICAN: >60
GLOBULIN: 4 G/DL (ref 2.3–3.5)
GLUCOSE BLD-MCNC: 140 MG/DL (ref 70–99)
GLUCOSE URINE: NEGATIVE MG/DL
HCG(URINE) PREGNANCY TEST: NEGATIVE
HCT VFR BLD CALC: 42.1 % (ref 37–47)
HDLC SERPL-MCNC: 41 MG/DL (ref 40–59)
HEMOGLOBIN: 13.9 G/DL (ref 12–16)
HYALINE CASTS: ABNORMAL /HPF (ref 0–5)
KETONES, URINE: NEGATIVE MG/DL
LDL CHOLESTEROL CALCULATED: 103 MG/DL (ref 0–129)
LEUKOCYTE ESTERASE, URINE: ABNORMAL
LYMPHOCYTES ABSOLUTE: 2.3 K/UL (ref 1–4.8)
LYMPHOCYTES RELATIVE PERCENT: 27 %
Lab: NORMAL
MCH RBC QN AUTO: 26.9 PG (ref 27–31.3)
MCHC RBC AUTO-ENTMCNC: 33 % (ref 33–37)
MCV RBC AUTO: 81.5 FL (ref 82–100)
METHADONE SCREEN, URINE: NORMAL
MONOCYTES ABSOLUTE: 0.7 K/UL (ref 0.2–0.8)
MONOCYTES RELATIVE PERCENT: 8.7 %
NEUTROPHILS ABSOLUTE: 5.2 K/UL (ref 1.4–6.5)
NEUTROPHILS RELATIVE PERCENT: 62.6 %
NITRITE, URINE: NEGATIVE
OPIATE SCREEN URINE: NORMAL
OXYCODONE URINE: NORMAL
PDW BLD-RTO: 14.4 % (ref 11.5–14.5)
PH UA: 6.5 (ref 5–9)
PHENCYCLIDINE SCREEN URINE: NORMAL
PLATELET # BLD: 269 K/UL (ref 130–400)
POTASSIUM SERPL-SCNC: 3.4 MEQ/L (ref 3.4–4.9)
PROPOXYPHENE SCREEN: NORMAL
PROTEIN UA: NEGATIVE MG/DL
RBC # BLD: 5.16 M/UL (ref 4.2–5.4)
RBC UA: ABNORMAL /HPF (ref 0–2)
SALICYLATE, SERUM: <0.3 MG/DL (ref 15–30)
SARS-COV-2, NAAT: NOT DETECTED
SODIUM BLD-SCNC: 136 MEQ/L (ref 135–144)
SPECIFIC GRAVITY UA: 1.02 (ref 1–1.03)
TOTAL CK: 166 U/L (ref 0–170)
TOTAL PROTEIN: 8.3 G/DL (ref 6.3–8)
TRIGL SERPL-MCNC: 77 MG/DL (ref 0–150)
TSH SERPL DL<=0.05 MIU/L-ACNC: 1.34 UIU/ML (ref 0.44–3.86)
URINE REFLEX TO CULTURE: YES
UROBILINOGEN, URINE: 1 E.U./DL
WBC # BLD: 8.3 K/UL (ref 4.8–10.8)
WBC UA: ABNORMAL /HPF (ref 0–5)

## 2020-10-10 PROCEDURE — 87086 URINE CULTURE/COLONY COUNT: CPT

## 2020-10-10 PROCEDURE — 84703 CHORIONIC GONADOTROPIN ASSAY: CPT

## 2020-10-10 PROCEDURE — 80053 COMPREHEN METABOLIC PANEL: CPT

## 2020-10-10 PROCEDURE — U0002 COVID-19 LAB TEST NON-CDC: HCPCS

## 2020-10-10 PROCEDURE — 84443 ASSAY THYROID STIM HORMONE: CPT

## 2020-10-10 PROCEDURE — 85025 COMPLETE CBC W/AUTO DIFF WBC: CPT

## 2020-10-10 PROCEDURE — G0480 DRUG TEST DEF 1-7 CLASSES: HCPCS

## 2020-10-10 PROCEDURE — 36415 COLL VENOUS BLD VENIPUNCTURE: CPT

## 2020-10-10 PROCEDURE — 82550 ASSAY OF CK (CPK): CPT

## 2020-10-10 PROCEDURE — 80061 LIPID PANEL: CPT

## 2020-10-10 PROCEDURE — 80307 DRUG TEST PRSMV CHEM ANLYZR: CPT

## 2020-10-10 PROCEDURE — 81001 URINALYSIS AUTO W/SCOPE: CPT

## 2020-10-10 PROCEDURE — 99285 EMERGENCY DEPT VISIT HI MDM: CPT

## 2020-10-10 PROCEDURE — 1240000000 HC EMOTIONAL WELLNESS R&B

## 2020-10-10 PROCEDURE — 93005 ELECTROCARDIOGRAM TRACING: CPT | Performed by: EMERGENCY MEDICINE

## 2020-10-10 RX ORDER — HYDROXYZINE PAMOATE 50 MG/1
50 CAPSULE ORAL EVERY 6 HOURS PRN
Status: DISCONTINUED | OUTPATIENT
Start: 2020-10-10 | End: 2020-10-15 | Stop reason: HOSPADM

## 2020-10-10 RX ORDER — BENZTROPINE MESYLATE 1 MG/ML
2 INJECTION INTRAMUSCULAR; INTRAVENOUS 2 TIMES DAILY PRN
Status: DISCONTINUED | OUTPATIENT
Start: 2020-10-10 | End: 2020-10-15 | Stop reason: HOSPADM

## 2020-10-10 RX ORDER — HALOPERIDOL 5 MG
5 TABLET ORAL EVERY 6 HOURS PRN
Status: DISCONTINUED | OUTPATIENT
Start: 2020-10-10 | End: 2020-10-15 | Stop reason: HOSPADM

## 2020-10-10 RX ORDER — HYDROXYZINE HYDROCHLORIDE 50 MG/ML
50 INJECTION, SOLUTION INTRAMUSCULAR EVERY 6 HOURS PRN
Status: DISCONTINUED | OUTPATIENT
Start: 2020-10-10 | End: 2020-10-15 | Stop reason: HOSPADM

## 2020-10-10 RX ORDER — HALOPERIDOL 5 MG/ML
5 INJECTION INTRAMUSCULAR EVERY 6 HOURS PRN
Status: DISCONTINUED | OUTPATIENT
Start: 2020-10-10 | End: 2020-10-15 | Stop reason: HOSPADM

## 2020-10-10 RX ORDER — MAGNESIUM HYDROXIDE/ALUMINUM HYDROXICE/SIMETHICONE 120; 1200; 1200 MG/30ML; MG/30ML; MG/30ML
30 SUSPENSION ORAL EVERY 6 HOURS PRN
Status: DISCONTINUED | OUTPATIENT
Start: 2020-10-10 | End: 2020-10-15 | Stop reason: HOSPADM

## 2020-10-10 ASSESSMENT — ENCOUNTER SYMPTOMS
SHORTNESS OF BREATH: 0
SORE THROAT: 0
BACK PAIN: 0
COUGH: 0
ABDOMINAL PAIN: 0
VOMITING: 0
NAUSEA: 0
DIARRHEA: 0

## 2020-10-10 ASSESSMENT — SLEEP AND FATIGUE QUESTIONNAIRES
AVERAGE NUMBER OF SLEEP HOURS: 8
DO YOU USE A SLEEP AID: NO
SLEEP PATTERN: NORMAL
DO YOU HAVE DIFFICULTY SLEEPING: NO

## 2020-10-10 ASSESSMENT — PATIENT HEALTH QUESTIONNAIRE - PHQ9: SUM OF ALL RESPONSES TO PHQ QUESTIONS 1-9: 12

## 2020-10-10 NOTE — PROGRESS NOTES
Pt cooperative with admission assessment/ unit consents. Pt reports admission d/t depression and SI after ex boyfriend broke up with pt. Pt states she was found by her roommate with a phone  cord wrapped around her neck. Pt states she removed the cord herself when her roommate came in the room. Pt voices feeling better d/t admission and would like to get on medications. Pt reports stressor d/t housing- states [facility] is supposed to be helping but pt has not heard from them. Pt has a  thru [facility]. Pt currently employed as a \"\" at [employer]. Pt reports support system as mom and a friend. Pt denies appetite disturbances and reports sleeping 8 hours per night. Pt does not drive but states she has transportation when needed. Pt also states she has a court date Tuesday 10/13/2020 for pending charges for theft and assault.

## 2020-10-10 NOTE — CARE COORDINATION
Report taken from [facility].. 23year old female admitted Voluntarily with a DX:  Bipolar Depression. Pt states that she has been having increased depression. Pt has transportation problem, boyfriend broke up with her, and had lost apartment recently. Pt is cooperative and tearful. Pt admits that never filled medication after admission in April. Roommate reports pt. Had a telephone cord wrapped around her neck. No marks noted. Vitals WNL. HX: seizures and last one was when she was 14. No meds ordered.
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
## 2020-10-10 NOTE — GROUP NOTE
Group Therapy Note    Date: 10/10/2020    Group Start Time: 1830  Group End Time: 1900  Group Topic: Recreational    [facility]    Yash Glynn        Group Therapy Note    Attendees: 11/19       Patient's Goal:  To participate in the Activity Group's game of [employer]. Notes:  Patient actively participated in the Activity Group.     Status After Intervention:  Unchanged    Participation Level: Interactive    Participation Quality: Appropriate and Attentive      Speech:  normal      Thought Process/Content: Logical      Affective Functioning: Flat      Mood: depressed      Level of consciousness:  Alert      Response to Learning: Able to verbalize current knowledge/experience      Endings: None Reported    Modes of Intervention: Activity      Discipline Responsible: [address], [employer]      Signature:  Yash Glynn

## 2020-10-10 NOTE — CARE COORDINATION
Pt. Calm, cooperative, and pleasant. Denies all. \"I feel better now that I know I'm going to get some help. \"  Pt. Laughs during the entire assessment. 3/10 depression and denies anxiety. Pt. Refused her dinner tray and states she has no appetite. Pt. Is currently eating a turkey wrap.

## 2020-10-10 NOTE — ED TRIAGE NOTES
Pt states that has been having increased depression. Pt has transportation problem, boyfriend broke up with her, and had lost apartment recently. Pt is cooperative and tearful. Pt admits that never filled medication after admission in April. Denies any other complaints at this time.

## 2020-10-10 NOTE — ED PROVIDER NOTES
[facility] I  eMERGENCYdEPARTMENT eNCOUnter      Pt Name: Clementina Julian  MRN: [id number]  [place] 1995  Date of evaluation: 10/10/2020  Carl Alfonso MD    CHIEF COMPLAINT           HPI  Clementina Julian is a 23 y.o. female per chart review has a h/o PTSD, seizures, depression presents to the ED with depression, SI.  Pt notes gradual onset, moderate, constant, depression x 2 months. +SI. No plan. Pt denies HI, AVH. Pt denies fever, n/v, cp, sob, dysuria, diarrhea. ROS  Review of Systems   Constitutional: Negative for activity change, chills and fever. HENT: Negative for ear pain and sore throat. Eyes: Negative for visual disturbance. Respiratory: Negative for cough and shortness of breath. Cardiovascular: Negative for chest pain, palpitations and leg swelling. Gastrointestinal: Negative for abdominal pain, diarrhea, nausea and vomiting. Genitourinary: Negative for dysuria. Musculoskeletal: Negative for back pain. Skin: Negative for rash. Neurological: Negative for dizziness and weakness. Psychiatric/Behavioral: Positive for decreased concentration, dysphoric mood, self-injury and suicidal ideas. Except as noted above the remainder of the review of systems was reviewed and negative. PAST MEDICAL HISTORY     Past Medical History:   Diagnosis Date    Depression     PTSD (post-traumatic stress disorder)     Seizures ([facility] [address]     as a child last seizure 14years old         SURGICAL HISTORY     History reviewed. No pertinent surgical history.       [address]       Current Discharge Medication List      CONTINUE these medications which have NOT CHANGED    Details   medroxyPROGESTERone (DEPO-PROVERA) 150 MG/ML injection Inject 150 mg into the muscle every 3 months Indications: pt is unsure of dosage             ALLERGIES     Tylenol [acetaminophen]    FAMILY HISTORY       Family History   Problem Relation Age of Onset    No Known Problems Mother mental illness    No Known Problems Father           SOCIAL HISTORY       Social History     Socioeconomic History    Marital status: Single     Spouse name: None    Number of children: None    Years of education: None    Highest education level: None   Occupational History    None   Social Needs    Financial resource strain: None    Food insecurity     Worry: None     Inability: None    Transportation needs     Medical: None     Non-medical: None   Tobacco Use    Smoking status: Never Smoker    Smokeless tobacco: Never Used   Substance and Sexual Activity    Alcohol use: No    Drug use: No    Sexual activity: Yes     Partners: Male   Lifestyle    Physical activity     Days per week: None     Minutes per session: None    Stress: None   Relationships    Social connections     Talks on phone: None     Gets together: None     Attends Amish service: None     Active member of club or organization: None     Attends meetings of clubs or organizations: None     Relationship status: None    Intimate partner violence     Fear of current or ex partner: None     Emotionally abused: None     Physically abused: None     Forced sexual activity: None   Other Topics Concern    None   Social History Narrative    None         PHYSICAL EXAM       ED Triage Vitals [10/10/20 1224]   BP Temp Temp Source Pulse Resp SpO2 Height Weight   (!) 179/80 97.9 °F (36.6 °C) Temporal 99 19 98 % 5' 7\" (1.702 m) (!) 390 lb (176.9 kg)       Physical Exam  Vitals signs and nursing note reviewed. Constitutional:       Appearance: She is well-developed. HENT:      Head: Normocephalic. Right Ear: External ear normal.      Left Ear: External ear normal.   Eyes:      Conjunctiva/sclera: Conjunctivae normal.      Pupils: Pupils are equal, round, and reactive to light. Neck:      Musculoskeletal: Normal range of motion and neck supple. Cardiovascular:      Rate and Rhythm: Normal rate and regular rhythm.       Heart sounds: Normal heart sounds. Pulmonary:      Effort: Pulmonary effort is normal.      Breath sounds: Normal breath sounds. Abdominal:      General: Bowel sounds are normal. There is no distension. Palpations: Abdomen is soft. Tenderness: There is no abdominal tenderness. Musculoskeletal: Normal range of motion. Skin:     General: Skin is warm and dry. Neurological:      Mental Status: She is alert and oriented to person, place, and time. Psychiatric:      Comments: Flat affect. No flight of ideas, circumferential thoughts, pressured speech. MDM  22 yo female presents to the ED with depression, SI.  Pt is afebrile, hemodynamically stable. EKG shows NSR with HR 99, normal axis, normal intervals, no ST changes. Labs unremarkable. UA, tox negative. Pt is medically clear for psych evaluation. Case discussed with Dr. John Martínez who recommended admission. Pt admitted to psych in stable condition. Pt understands plan. FINAL IMPRESSION      1.  Bipolar depression [facility])          DISPOSITION/PLAN   DISPOSITION Admitted 10/10/2020 04:15:56 PM        DISCHARGE MEDICATIONS:  [unfilled]         Swati Moeller MD(electronically signed)  Attending Emergency Physician            Swati Moeller MD  10/13/20 [facility] [address], [place]  10/13/20 9171

## 2020-10-10 NOTE — GROUP NOTE
Group Therapy Note    Date: 10/10/2020    Group Start Time: 5802  Group End Time: 1700  Group Topic: Healthy Living/Wellness    [facility]    Renzo Bright        Group Therapy Note    Attendees: 10/19       Patient's Goal:  To learn how to incorporate distraction skills when in high distress. Notes:  Patient presented to group late due to having just arrived on the unit but quietly observed while present. Status After Intervention:  Unchanged    Participation Level:  Active Listener    Participation Quality: Appropriate and Attentive      Speech:  mute      Thought Process/Content: Logical      Affective Functioning: Flat      Mood: euthymic      Level of consciousness:  Attentive      Response to Learning: Progressing to goal      Endings: None Reported    Modes of Intervention: Education      Discipline Responsible: [address], [employer]      Signature:  Renzo Bright

## 2020-10-10 NOTE — ED NOTES
Pt clothes change into  safe clothing. Urine obtained.  Lab called     Larissa Flaherty RN  10/10/20 9381

## 2020-10-11 LAB — URINE CULTURE, ROUTINE: NORMAL

## 2020-10-11 PROCEDURE — 6370000000 HC RX 637 (ALT 250 FOR IP): Performed by: NURSE PRACTITIONER

## 2020-10-11 PROCEDURE — 1240000000 HC EMOTIONAL WELLNESS R&B

## 2020-10-11 PROCEDURE — 6370000000 HC RX 637 (ALT 250 FOR IP): Performed by: PSYCHIATRY & NEUROLOGY

## 2020-10-11 RX ORDER — ARIPIPRAZOLE 5 MG/1
5 TABLET ORAL DAILY
Status: DISCONTINUED | OUTPATIENT
Start: 2020-10-11 | End: 2020-10-15 | Stop reason: HOSPADM

## 2020-10-11 RX ORDER — OXCARBAZEPINE 300 MG/1
300 TABLET, FILM COATED ORAL 2 TIMES DAILY
Status: DISCONTINUED | OUTPATIENT
Start: 2020-10-11 | End: 2020-10-15 | Stop reason: HOSPADM

## 2020-10-11 RX ADMIN — METOPROLOL TARTRATE 25 MG: 25 TABLET, FILM COATED ORAL at 12:39

## 2020-10-11 RX ADMIN — OXCARBAZEPINE 300 MG: 300 TABLET, FILM COATED ORAL at 21:38

## 2020-10-11 RX ADMIN — ARIPIPRAZOLE 5 MG: 5 TABLET ORAL at 10:29

## 2020-10-11 RX ADMIN — OXCARBAZEPINE 300 MG: 300 TABLET, FILM COATED ORAL at 10:29

## 2020-10-11 ASSESSMENT — LIFESTYLE VARIABLES: HISTORY_ALCOHOL_USE: NO

## 2020-10-11 NOTE — PROGRESS NOTES
Pt continues sitting in sensory room listening to music, appears more calm and is no longer tearful.

## 2020-10-11 NOTE — SUICIDE SAFETY PLAN
SAFETY PLAN    A suicide Safety Plan is a document that supports someone when they are having thoughts of suicide. Warning Signs that indicate a suicidal crisis may be developing: What (situations, thoughts, feelings, body sensations, behaviors, etc.) do you experience that lets you know you are beginning to think about suicide? 1. Sucidal thoughts  2. Acting on the thoughts  3. Depressive episodes    Internal Coping Strategies:  What things can I do (relaxation techniques, hobbies, physical activities, etc.) to take my mind off my problems without contacting another person? 1. Music  2. Drawing  3. Writing    People and social settings that provide distraction: Who can I call or where can I go to distract me? 1. Name: Terry Lunch  2. Name: Crow Harper KJMIT:[phone]   3. Place: Walking at park            4. Place: Outside    People whom I can ask for help: Who can I call when I need help - for example, friends, family, clergy, someone else? 1. Name: Kail[employer]        Phone: unsure  2. Name: Karla Fallthanh Phone: [phone]  9. Name: None  Phone: None. Professionals or [address] [place], [place] [address]vd I can contact during a crisis: Who can I call for help - for example, my doctor, my psychiatrist, my psychologist, a mental health provider, a suicide hotline? 1. Clinician Name: Mirna Urrutia   Phone: [phone]      Clinician Pager or Emergency Contact #:[phone]    2. Clinician Name: Sarah VALDERRAMAQ:[id number]      Clinician Pager or Emergency Contact #:[phone]    3. Suicide Prevention Lifeline: 0-397-959-TALK (5534)    4. [address] [place] [address] Emergency Services -  for example, [facility] suicide hotline, [address] [place] [address] Avenue: [address]      Emergency Services Address: [facility]      Emergency Services Phone: [phone]    Making the environment safe: How can I make my environment (house/apartment/living space) safer? For example, can I remove guns, medications, and other items? 1.  Handy Pittman has remove anything patient could use for self-harm  2.  Friend will safeguard and secure medication in the home

## 2020-10-11 NOTE — PROGRESS NOTES
Pt. refused to attend the 1100 skills group, despite staff encouragement.  Electronically signed by Reagan Noe on 10/11/2020 at 1:00 PM

## 2020-10-11 NOTE — PROGRESS NOTES
Pt. declined to attend the 0900 community meeting, despite staff encouragement.  Electronically signed by Lora Todd on 10/11/2020 at 9:31 AM

## 2020-10-11 NOTE — CARE COORDINATION
Pt. Out on unit, but keeps to self. Sad, flat affect and avoids eye contact. Denies all. \"I just don't like being here. It's not my thing. \"  5/10 depression and denies anxiety, but restless during assessment. Pt. Skipped breakfast.  \"I'll just eat when I get home. \"  Pt. Reports the food is fine, she just doesn't have an appetite.

## 2020-10-11 NOTE — PROGRESS NOTES
Pt tearful in sensory room. Pt states she does not feel comfortable here d/t being around other people that she does not know and states she wants to go home. Pt unable to verbalize an understanding of the reason for admission as a serious attempt. Pt unable to state what further comfort measures can be provided to pt by staff. Pt refuses offered PRN medications and becomes irritable and dismissive when offered. Reiterated to pt that her goal during admission was to \"get back on medication\". Encouraged deep breathing and advised to let staff know of any further needs.

## 2020-10-11 NOTE — PROGRESS NOTES
Pt. presents pleasant. Low monotone of voice. Reports recent break up with bf of 2 months and subsequently losing place to live as well. Reports increased depression  and sadness since. Reports suicidal with a plan to choke herself. Hx of non compliance with meds after last dc from [facility]. Dariusz Oneil Poor eye contact. Has court date 10- for theft and assault. Low motivation. Is employed full time as a . Denies drinking ETOH and does not smoke marijuana or use any other drugs. Denies AH/VH and has no current si/hi.  Stressors include  1.lost place due to not working for [employer]  *taking walks, reading books, listening to music  Electronically signed by Iazbel Potter on 10/11/2020 at 9:56 AM

## 2020-10-11 NOTE — PROGRESS NOTES
Patient did not attend group despite staff encouragement.   Electronically signed by Gagan Saunders on 10/10/2020 at 9:49 PM

## 2020-10-11 NOTE — CARE COORDINATION
Brief Intervention and Referral to Treatment Summary    Patient was provided PHQ-9, AUDIT and DAST Screening:      PHQ-9 Score: 12  AUDIT Score:  0  DAST Score:  0    Patients substance use is considered     Low Risk/Healthy x  Moderate Risk  Harmful  Dependent    Patients depression is considered:     Minimal  Mild   Moderate x  Moderately Severe  Severe    Brief Education Was Provided N/A    Patient was receptive  Patient was not receptive      Brief Intervention Is Provided (Only for AUDIT or DAST) N/A    Patient reports readiness to decrease and/or stop use and a plan was discussed   Patient denies readiness to decrease and/or stop use and a plan was not discussed      Recommendations/Referrals for Brief and/or Specialized Treatment Provided to Patient   Patient denied any past or present drug/alcohol issues. As a result, no brief education or intervention was completed.     Electronically signed by ELIESER Jane on 10/11/2020 at 1:45 PM

## 2020-10-11 NOTE — CARE COORDINATION
BHI Biopsychosocial Assessment    Current Level of Psychosocial Functioning     Independent   Dependent    Minimal Assist x    Comments: Patient is employed and lives with a friend. She receives some government assistance to maintain a reasonable quality of life. Psychosocial High Risk Factors (check all that apply)    Unable to obtain meds   Chronic illness/pain    Substance abuse   Lack of Family Support   Financial stress   Isolation   Inadequate Community Resources  Suicide attempt(s) x  Not taking medications x  Victim of crime   Developmental Delay  Unable to manage personal needs    Age 72 or older   Homeless  No transportation   Readmission within 30 days  Unemployment  Traumatic Event    Comments: Patient has at least two high risk factors associated with this admission. Psychiatric Advanced Directives: None Reported. Family to Involve in Treatment: Patient provided her mother's contact information to complete collateral.    Sexual Orientation:  Patient is in neither a hetero or homosexual relationship at this time. Patient Strengths: Patient is very bright and articulate. Patient Barriers: None Identified. Opiate Education Provided:  N/A    CMHC/mental health history: Patient is a client at the [facility]. Plan of Care   medication management, group/individual therapies, family meetings, psycho -education, treatment team meetings to assist with stabilization    Initial Discharge Plan:  Patient will return home and follow the recommendations of the treatment team.      Clinical Summary:    Patient is a 23year old female who was admitted to the [facility] due to presenting with depression and suicidal ideation. Reportedly, patient has a history of PTSD and depression. When interviewed, patient stated she was getting used to her medication. Patient shared she had been off her medication for about a year. She did not seem bothered by her lack of investment in managing her symptoms.  In addition, it seems she looks to others to guide her in taking responsibility for her care. Patient stated she is working and lives with a friend. She has made two suicide attempts and was not able to use her typical coping strategies e.g. walking, going to the park, writing and drawing. As session progressed, patient seemed to present with a brighter affect and was more future oriented in her verbals.      Electronically signed by Kendra Portillo on 10/11/2020 at 1:55 PM
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
## 2020-10-12 PROCEDURE — 6370000000 HC RX 637 (ALT 250 FOR IP): Performed by: PSYCHIATRY & NEUROLOGY

## 2020-10-12 PROCEDURE — 6370000000 HC RX 637 (ALT 250 FOR IP): Performed by: NURSE PRACTITIONER

## 2020-10-12 PROCEDURE — 99232 SBSQ HOSP IP/OBS MODERATE 35: CPT | Performed by: PSYCHIATRY & NEUROLOGY

## 2020-10-12 PROCEDURE — 1240000000 HC EMOTIONAL WELLNESS R&B

## 2020-10-12 RX ADMIN — OXCARBAZEPINE 300 MG: 300 TABLET, FILM COATED ORAL at 20:55

## 2020-10-12 RX ADMIN — ARIPIPRAZOLE 5 MG: 5 TABLET ORAL at 09:11

## 2020-10-12 RX ADMIN — OXCARBAZEPINE 300 MG: 300 TABLET, FILM COATED ORAL at 09:10

## 2020-10-12 RX ADMIN — METOPROLOL TARTRATE 25 MG: 25 TABLET, FILM COATED ORAL at 09:11

## 2020-10-12 NOTE — PROGRESS NOTES
Lifestyle    Physical activity     Days per week: Not on file     Minutes per session: Not on file    Stress: Not on file   Relationships    Social connections     Talks on phone: Not on file     Gets together: Not on file     Attends [employer] service: Not on file     Active member of club or organization: Not on file     Attends meetings of clubs or organizations: Not on file     Relationship status: Not on file    Intimate partner violence     Fear of current or ex partner: Not on file     Emotionally abused: Not on file     Physically abused: Not on file     Forced sexual activity: Not on file   Other Topics Concern    Not on file   Social History Narrative    Not on file           ROS:  [x] All negative/unchanged except if checked.  Explain positive(checked items) below:  [] Constitutional  [] Eyes  [] Ear/Nose/Mouth/Throat  [] Respiratory  [] CV  [] GI  []   [] Musculoskeletal  [] Skin/Breast  [] Neurological  [] Endocrine  [] Heme/Lymph  [] Allergic/Immunologic    Explanation:     MEDICATIONS:    Current Facility-Administered Medications:     ARIPiprazole (ABILIFY) tablet 5 mg, 5 mg, Oral, Daily, Patsy Coe MD, 5 mg at 10/12/20 0911    OXcarbazepine (TRILEPTAL) tablet 300 mg, 300 mg, Oral, BID, Patsy Coe MD, 300 mg at 10/12/20 0910    metoprolol tartrate (LOPRESSOR) tablet 25 mg, 25 mg, Oral, Daily, WALESKA Knox - CNP, 25 mg at 10/12/20 0911    benztropine mesylate (COGENTIN) injection 2 mg, 2 mg, Intramuscular, BID PRN, Patsy Coe MD    magnesium hydroxide (MILK OF MAGNESIA) 400 MG/5ML suspension 30 mL, 30 mL, Oral, Daily PRN, Patsy Coe MD    aluminum & magnesium hydroxide-simethicone (MAALOX) 200-200-20 MG/5ML suspension 30 mL, 30 mL, Oral, Q6H PRN, Patsy Coe MD    hydrOXYzine (VISTARIL) injection 50 mg, 50 mg, Intramuscular, Q6H PRN **OR** hydrOXYzine (VISTARIL) capsule 50 mg, 50 mg, Oral, Q6H PRN, Patsy Coe MD    haloperidol lactate (HALDOL) injection 5 mg, 5 mg, Intramuscular, Q6H PRN **OR** haloperidol (HALDOL) tablet 5 mg, 5 mg, Oral, Q6H PRN, Edvin Goetz MD      Examination:  /83   Pulse 118   Temp 98 °F (36.7 °C) (Oral)   Resp 18   Ht 5' 7\" (1.702 m)   Wt (!) 390 lb (176.9 kg)   LMP  (LMP Unknown)   SpO2 97%   BMI 61.08 kg/m²   Gait - steady  Medication side effects(SE): no    Mental Status Examination:    Level of consciousness:  within normal limits   Appearance:  fair grooming and fair hygiene  Behavior/Motor:  psychomotor retardation  Attitude toward examiner:  cooperative  Speech:  slow   Mood: decreased range and depressed  Affect:  blunted  Thought processes:  slow   Thought content:  Suicidal Ideation:  passive  Delusions:  no evidence of delusions  Perceptual Disturbance:  denies any perceptual disturbance  Cognition:  oriented to person, place, and time   Concentration distractible  Insight poor   Judgement poor     ASSESSMENT:   Patient symptoms are:  [] Well controlled  [] Improving  [] Worsening  [x] No change      Diagnosis:   Active Problems:    Bipolar depression ([place].)  Resolved Problems:    * No resolved hospital problems. *      LABS:    Recent Labs     10/10/20  1254   WBC 8.3   HGB 13.9        Recent Labs     10/10/20  1254      K 3.4      CO2 23   BUN 7   CREATININE 0.60   GLUCOSE 140*     Recent Labs     10/10/20  1254   BILITOT 0.4   ALKPHOS 88   AST 13   ALT 17     Lab Results   Component Value Date    LABAMPH Neg 10/10/2020    BARBSCNU Neg 10/10/2020    LABBENZ Neg 10/10/2020    LABMETH Neg 10/10/2020    OPIATESCREENURINE Neg 10/10/2020    PHENCYCLIDINESCREENURINE Neg 10/10/2020    ETOH <10 10/10/2020     Lab Results   Component Value Date    TSH 1.340 10/10/2020     No results found for: LITHIUM  No results found for: VALPROATE, CBMZ    RISK ASSESSMENT: high risk of impulsivity    Treatment Plan:  Reviewed current Medications with the patient.    Risks, benefits, side effects, drug-to-drug interactions and alternatives to treatment were discussed. Collateral information:   CD evaluation  Encourage patient to attend group and other milieu activities.   Discharge planning discussed with the patient and treatment team.    PSYCHOTHERAPY/COUNSELING:  [x] Therapeutic interview  [x] Supportive  [] CBT  [] Ongoing  [] Other    [x] Patient continues to need, on a daily basis, active treatment furnished directly by or requiring the supervision of inpatient psychiatric personnel      Anticipated Length of stay:            Electronically signed by Damien Viera MD on 10/12/2020 at 11:31 AM

## 2020-10-12 NOTE — PROGRESS NOTES
Patient did not attend group despite staff encouragement.   Electronically signed by Nida Johansen on 10/12/2020 at 5:18 PM

## 2020-10-12 NOTE — PROGRESS NOTES
Patient did not attend group despite staff encouragement.   Electronically signed by Lauren Garcia on 10/11/2020 at 8:09 PM

## 2020-10-12 NOTE — PROGRESS NOTES
Patient did not attend group despite staff encouragement.   Electronically signed by Darryle Moose on 10/11/2020 at 10:10 PM

## 2020-10-12 NOTE — PROGRESS NOTES
Pt resting in bed throughout evening shift with eyes closed. Pt noted to be sucking her thumb while resting. Pt did not come out for HS snack. Compliant with HS medication.

## 2020-10-12 NOTE — PROGRESS NOTES
Pt. attended the 0900 community meeting. Electronically signed by Sudhir Romero, [address] on 10/12/2020 at 9:52 AM

## 2020-10-12 NOTE — GROUP NOTE
Group Therapy Note    Date: 10/12/2020    Group Start Time: 1100  Group End Time: 1150  Group Topic: Psychoeducation    [facility]    Ning Cabrales        Group Therapy Note    Attendees: 8         Patient's Goal:  \"To go home\"    Notes:  Patient was attentive and work well on her project in group. Status After Intervention:  Unchanged    Participation Level:  Active Listener    Participation Quality: Appropriate      Speech:  quiet      Thought Process/Content: Linear      Affective Functioning: Congruent      Mood: calm      Level of consciousness:  Alert      Response to Learning: Progressing to goal      Endings: None Reported    Modes of Intervention: Education, Socialization and Activity      Discipline Responsible: Psychoeducational Specialist      Signature:  Ning Cabrales

## 2020-10-12 NOTE — CARE COORDINATION
FAMILY COLLATERAL NOTE    Family/Support Name: Elle Andrea #: [phone]  Relationship to Pt[de-identified] mom        Family/Support contact aware of hospitalization: yes- pt called mom and informed her  Presenting Symptoms/Current Concerns:  Pt [facility] due to [place] [facility]. Friend called 911 due to finding pt with a phone  tied around her neck. Reports a recent break up with a boyfriend. Reports increased depression since losing apartment and issues with transportation. Has an upcoming court date due to assault and theft. Previous SI attempt in 11/2019 and was admitted to . Top 3 Life Stressors:   \"trying to keep a job\"  \"having someone be there for her. \"  \"maintaining a healthy relationship. \"        Background History Relevant to Current Hospitalization:  Reports a recent break up with boyfriend roughly a week ago. Reports pt called mom after the break up and pt seemed \"fine\" so mom was confused because pt seemed fine when they talked. Reports this being her first SI. Reports pt has her own apartment and that her friend comes over and helps her out. 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 Denies pt having any MH history or medication trials. Mother provided limited information. Mother was not aware of past SI attempt and pt potentially being homeless at this time. Family Mental Health/Substance Use History:   Mom is a pt at the [address] [place] [address] due to depression. Support Network's Goal for Hospitalization: \"stop feeling sorry for herself just because shes going through something she shouldn't let it bring her down. \"     Discharge Plan: discharge to the community and [facility]       Support Network Supportive of Discharge Plan: in agreement.        Support can confirm Safety of Location and Security of Weapons: no firearms

## 2020-10-12 NOTE — PROGRESS NOTES
Patient did not attend group despite staff encouragement.   Electronically signed by Dashawn Kaur on 10/12/2020 at 7:43 PM

## 2020-10-12 NOTE — PROGRESS NOTES
Daily Note: [id number]    Pt visible on the unit and minimally social with peers. Pt reports passive, fleeting passive death wish, commits to safety on the unit and denies active SI. Pt denies HI, and A/V hallucinations. Pt selectivley attended groups. Pt noted to have a sad, flat affect. Pt noted to nap in her room after lunch.

## 2020-10-13 PROCEDURE — 93010 ELECTROCARDIOGRAM REPORT: CPT | Performed by: INTERNAL MEDICINE

## 2020-10-13 PROCEDURE — 1240000000 HC EMOTIONAL WELLNESS R&B

## 2020-10-13 PROCEDURE — 6370000000 HC RX 637 (ALT 250 FOR IP): Performed by: PSYCHIATRY & NEUROLOGY

## 2020-10-13 PROCEDURE — 99232 SBSQ HOSP IP/OBS MODERATE 35: CPT | Performed by: PSYCHIATRY & NEUROLOGY

## 2020-10-13 PROCEDURE — 6370000000 HC RX 637 (ALT 250 FOR IP): Performed by: NURSE PRACTITIONER

## 2020-10-13 RX ADMIN — METOPROLOL TARTRATE 25 MG: 25 TABLET, FILM COATED ORAL at 08:31

## 2020-10-13 RX ADMIN — OXCARBAZEPINE 300 MG: 300 TABLET, FILM COATED ORAL at 08:31

## 2020-10-13 RX ADMIN — OXCARBAZEPINE 300 MG: 300 TABLET, FILM COATED ORAL at 21:09

## 2020-10-13 RX ADMIN — ARIPIPRAZOLE 5 MG: 5 TABLET ORAL at 08:31

## 2020-10-13 NOTE — GROUP NOTE
Group Therapy Note    Date: 10/13/2020    Group Start Time: 1100  Group End Time: 1150  Group Topic: Psychoeducation    [facility]    Giovany Lopez        Group Therapy Note    Attendees: 7         Patient's Goal:  \"To go home\"    Notes:  Patient was more animated in group. She work fairly well on her project. Status After Intervention:  Improved    Participation Level:  Active Listener    Participation Quality: Appropriate      Speech:  quiet      Thought Process/Content: Linear      Affective Functioning: Congruent      Mood: calm      Level of consciousness:  Alert and Attentive      Response to Learning: Progressing to goal      Endings: None Reported    Modes of Intervention: Education, Socialization and Activity      Discipline Responsible: Psychoeducational Specialist      Signature:  Giovany Lopez

## 2020-10-13 NOTE — CARE COORDINATION
Writer faxed over admission letter signed by dr. Ernestina Lewis  [facility] office that pt is admitted to the hospital due to her court date on 10/13/2020.  Electronically signed by ROSA Kimble on 10/13/2020 at 9:38 AM

## 2020-10-13 NOTE — PROGRESS NOTES
Pt isolating to room. Pt voiced positive outcome, being back on her medication, [facility] took too long, for a follow up visit. Pt voiced goal ,  not keep her emotions, bottle up inside. Pt reports showering today. Pt reports good appetite. Pt reports good sleep. Pt rates anxiety /10, deniesPt rates depression, 1 /10. On a scale from 1 through 10, 10 being the highest. Pt reports attending groups. Pt denies SI, HI and A/V hallucinations. No voiced delusions at this time. Pt alert and oriented x 4. Will continue to monitor.

## 2020-10-13 NOTE — CARE COORDINATION
Patient did not attend group despite staff encouragement.  Electronically signed by ROSA Cuba on 10/13/2020 at 3:08 PM

## 2020-10-13 NOTE — PROGRESS NOTES
Daily Note: [id number]    Pt denies SI, HI, and A/V hallucinations. Pt noted to be brighter in appearance. Pt reports anxiety and depression are present but improving. Pt out for meals and selectively attending groups. Pt reports good sleep and appetite. Pt noted to nap intermittently throughout the shift.

## 2020-10-13 NOTE — PROGRESS NOTES
Spiritual Support Group Note    Number of Participants in Group: 9                       Time: 13:30-14:20    Goal: Relief from isolation and loneliness             Sarah Sharing             Self-understanding and gain insight              Acceptance and belonging            Recognize they are not alone                Socialization             Empowerment       Encouragement    Topic:  [] Spiritual Wellness and Self Care                  [x] Hope                     [] Connecting with Divine/Others        [] Thankfulness and Gratitude               [x]  Meaningfulness and Purpose               [] Forgiveness               [] Peace               [] Connect to [employer]      [] Other    Participation Level:   [x] Active Listener   [] Minimal   [] Monopolizing   [] Interactive   [] No Participation   []  Other:     Attention:   [x] Alert   [] Distractible   [] Drowsy   [] Poor   [] Other:    Manner:   [x] Cooperative   [] Suspicious   [] Withdrawn   [] Guarded   [] Irritable   [] Inhospitable   [] Other:     Others Comments from Group:

## 2020-10-13 NOTE — PROGRESS NOTES
Patient did not attend group despite staff encouragement.   Electronically signed by Tiffanie Durbin on 10/13/2020 at 7:09 PM

## 2020-10-13 NOTE — PROGRESS NOTES
Pt. attended the 0900 community meeting. Electronically signed by Laury Mallory [address] on 10/13/2020 at 9:43 AM

## 2020-10-13 NOTE — PROGRESS NOTES
Pt out on unit, social with peers, bright upon approach. She feels improved and motivated to stay compliant with medications after discharge. She denies anxiety and rates her depression a 1/10. She says her sleep is \"pretty good\" and her appetite has improved. She denies suicidal ideation, homicidal ideation, and hallucinations.

## 2020-10-14 PROCEDURE — 1240000000 HC EMOTIONAL WELLNESS R&B

## 2020-10-14 PROCEDURE — 6370000000 HC RX 637 (ALT 250 FOR IP): Performed by: PSYCHIATRY & NEUROLOGY

## 2020-10-14 PROCEDURE — 6370000000 HC RX 637 (ALT 250 FOR IP): Performed by: NURSE PRACTITIONER

## 2020-10-14 PROCEDURE — 99231 SBSQ HOSP IP/OBS SF/LOW 25: CPT | Performed by: PSYCHIATRY & NEUROLOGY

## 2020-10-14 PROCEDURE — 90833 PSYTX W PT W E/M 30 MIN: CPT | Performed by: PSYCHIATRY & NEUROLOGY

## 2020-10-14 RX ADMIN — OXCARBAZEPINE 300 MG: 300 TABLET, FILM COATED ORAL at 09:00

## 2020-10-14 RX ADMIN — OXCARBAZEPINE 300 MG: 300 TABLET, FILM COATED ORAL at 20:43

## 2020-10-14 RX ADMIN — ARIPIPRAZOLE 5 MG: 5 TABLET ORAL at 09:00

## 2020-10-14 RX ADMIN — METOPROLOL TARTRATE 25 MG: 25 TABLET, FILM COATED ORAL at 09:00

## 2020-10-14 NOTE — PROGRESS NOTES
[facility] NOTE       10/14/2020     Patient was seen and examined in person, Chart reviewed   Patient's case discussed with staff/team    Chief Complaint: depression SI    Interim History:     Patient report feeling better. She is less depressed. She denies having any suicidal or homicidal thoughts. Patient denies any audiovisual hallucinations. She is future oriented.     Appetite:   [] Normal/Unchanged  [] Increased  [x] Decreased      Sleep:       [] Normal/Unchanged  [x] Fair       [] Poor              Energy:    [] Normal/Unchanged  [] Increased  [x] Decreased        SI  Present  [x] Absent    HI  []Present  [x] Absent     Aggression:  [] yes  [x] no    Patient is [] able  [x] unable to CONTRACT FOR SAFETY     PAST MEDICAL/PSYCHIATRIC HISTORY:   Past Medical History:   Diagnosis Date    Depression     PTSD (post-traumatic stress disorder)     Seizures ([facility] [address]     as a child last seizure 14years old       FAMILY/SOCIAL HISTORY:  Family History   Problem Relation Age of Onset    No Known Problems Mother         mental illness    No Known Problems Father      Social History     Socioeconomic History    Marital status: Single     Spouse name: Not on file    Number of children: Not on file    Years of education: Not on file    Highest education level: Not on file   Occupational History    Not on file   Social Needs    Financial resource strain: Not on file    Food insecurity     Worry: Not on file     Inability: Not on file    Transportation needs     Medical: Not on file     Non-medical: Not on file   Tobacco Use    Smoking status: Never Smoker    Smokeless tobacco: Never Used   Substance and Sexual Activity    Alcohol use: No    Drug use: No    Sexual activity: Yes     Partners: Male   Lifestyle    Physical activity     Days per week: Not on file     Minutes per session: Not on file    Stress: Not on file   Relationships    Social Pulse 117   Temp 98.2 °F (36.8 °C) (Oral)   Resp 18   Ht 5' 7\" (1.702 m)   Wt (!) 390 lb (176.9 kg)   LMP  (LMP Unknown)   SpO2 98%   BMI 61.08 kg/m²   Gait - steady  Medication side effects(SE): no    Mental Status Examination:    Level of consciousness:  within normal limits   Appearance:  fair grooming and fair hygiene  Behavior/Motor: less psychomotor retardation  Attitude toward examiner:  cooperative  Speech:  slow   Mood: Less depressed  Affect:  blunted  Thought processes:  slow   Thought content:  Suicidal Ideation: Denies  Delusions:  no evidence of delusions  Perceptual Disturbance:  denies any perceptual disturbance  Cognition:  oriented to person, place, and time   Concentration distractible  Insight better  Judgement better    ASSESSMENT:   Patient symptoms are:  [] Well controlled  [x] Improving  [] Worsening  [] No change      Diagnosis:   Active Problems:    Bipolar depression ([facility]  Resolved Problems:    * No resolved hospital problems. *      LABS:    No results for input(s): WBC, HGB, PLT in the last 72 hours. No results for input(s): NA, K, CL, CO2, BUN, CREATININE, GLUCOSE in the last 72 hours. No results for input(s): BILITOT, ALKPHOS, AST, ALT in the last 72 hours. Lab Results   Component Value Date    LABAMPH Neg 10/10/2020    BARBSCNU Neg 10/10/2020    LABBENZ Neg 10/10/2020    LABMETH Neg 10/10/2020    OPIATESCREENURINE Neg 10/10/2020    PHENCYCLIDINESCREENURINE Neg 10/10/2020    ETOH <10 10/10/2020     Lab Results   Component Value Date    TSH 1.340 10/10/2020     No results found for: LITHIUM  No results found for: VALPROATE, CBMZ    Treatment Plan:  Reviewed current Medications with the patient. Medication as ordered  Risks, benefits, side effects, drug-to-drug interactions and alternatives to treatment were discussed. Collateral information:   CD evaluation  Encourage patient to attend group and other milieu activities.   Discharge planning discussed with the patient and treatment team.    PSYCHOTHERAPY/COUNSELING:  [x] Therapeutic interview  [x] Supportive  [] CBT  [] Ongoing  [] Other  Patient was seen 1:1 for 20 minutes, other than E&M time spent, focusing on      - coping skills techniques     - Anxiety management techniques discussed including deep breathing exercise and PMR     - discussing patients strength and weakness         - Focusing on negative cognition and maladaptive thoughts, which is feeding and maintaining the depression symptoms       [x] Patient continues to need, on a daily basis, active treatment furnished directly by or requiring the supervision of inpatient psychiatric personnel      Anticipated Length of stay: thursday            Electronically signed by German Borden MD on 10/14/2020 at 11:44 AM

## 2020-10-14 NOTE — GROUP NOTE
Group Therapy Note    Date: 10/14/2020    Group Start Time: 8625  Group End Time: 1500  Group Topic: Cognitive Skills    [facility]    ELIESER Colunga        Group Therapy Note    Attendees: 7         Patient's Goal: To participate in mood management group. Notes:  Patient learned about the cycle of anxiety. Status After Intervention:  Unchanged    Participation Level: Active Listener    Participation Quality: Appropriate      Speech:  normal      Thought Process/Content: Logical      Affective Functioning: Flat      Mood: depressed      Level of consciousness:  Attentive      Response to Learning: Able to verbalize current knowledge/experience      Endings: None Reported    Modes of Intervention: Education      Discipline Responsible: /Counselor      Signature:   ELIESER Colunga

## 2020-10-14 NOTE — GROUP NOTE
Group Therapy Note    Date: 10/14/2020    Group Start Time: 1000  Group End Time: 7960  Group Topic: Psychotherapy    [facility]    Tracey [employer]        Group Therapy Note    Attendees: 7         Patient's Goal:  To be discharged tomorrow    Notes:  Patient wants to [facility] up with [facility]    Status After Intervention:  Improved    Participation Level: Interactive    Participation Quality: Appropriate      Speech:  normal      Thought Process/Content: Logical      Affective Functioning: Congruent      Mood: anxious      Level of consciousness:  Alert      Response to Learning: Progressing to goal      Endings: None Reported    Modes of Intervention: Support      Discipline Responsible: /Counselor      Signature:  Tracey Ferguson, OluKai

## 2020-10-14 NOTE — PROGRESS NOTES
Pt. attended the 0900 community meeting.  Electronically signed by Toni Dennis on 10/14/2020 at 1:53 PM

## 2020-10-14 NOTE — PROGRESS NOTES
Patient did not attend group despite staff encouragement.   Electronically signed by Ammy Dennis on 10/14/2020 at 7:00 PM

## 2020-10-14 NOTE — CARE COORDINATION
Pt. Calm, cooperative, and pleasant. Denies all. Bright affect. Denies depression and anxiety. Reports good sleep and appetite. Out on unit, but keeps to self.

## 2020-10-14 NOTE — PROGRESS NOTES
Patient did not attend group despite staff encouragement.   Electronically signed by Catalino Og on 10/13/2020 at 9:25 PM

## 2020-10-14 NOTE — PROGRESS NOTES
Patient did not attend group despite staff encouragement.   Electronically signed by Cain Esquivel on 10/14/2020 at 4:57 PM

## 2020-10-14 NOTE — GROUP NOTE
Group Therapy Note    Date: 10/14/2020    Group Start Time: 1100  Group End Time: 1200  Group Topic: Psychoeducation    [facility]    Marilou Night, CTRS        Group Therapy Note    Attendees: 8         Patient's Goal:  \"to go home\"    Notes:  Pt. attended the 1100 skill group. Happy. Feels better and wants to be dc. Engaged in the group discussion. Status After Intervention:  Improved    Participation Level:  Active Listener and Interactive    Participation Quality: Appropriate, Attentive and Sharing      Speech:  normal      Thought Process/Content: Logical      Affective Functioning: Congruent      Mood: calm      Level of consciousness:  Alert, Oriented x4 and Attentive      Response to Learning: Progressing to goal      Endings: None Reported    Modes of Intervention: Education, Support, Socialization and Activity      Discipline Responsible: Psychoeducational Specialist      Signature:  Dom Martínez

## 2020-10-15 VITALS
SYSTOLIC BLOOD PRESSURE: 133 MMHG | WEIGHT: 293 LBS | RESPIRATION RATE: 20 BRPM | HEIGHT: 67 IN | HEART RATE: 102 BPM | BODY MASS INDEX: 45.99 KG/M2 | OXYGEN SATURATION: 97 % | TEMPERATURE: 98 F | DIASTOLIC BLOOD PRESSURE: 86 MMHG

## 2020-10-15 PROCEDURE — 99239 HOSP IP/OBS DSCHRG MGMT >30: CPT | Performed by: PSYCHIATRY & NEUROLOGY

## 2020-10-15 PROCEDURE — 6370000000 HC RX 637 (ALT 250 FOR IP): Performed by: PSYCHIATRY & NEUROLOGY

## 2020-10-15 PROCEDURE — 6370000000 HC RX 637 (ALT 250 FOR IP): Performed by: NURSE PRACTITIONER

## 2020-10-15 RX ORDER — ARIPIPRAZOLE 5 MG/1
5 TABLET ORAL DAILY
Qty: 15 TABLET | Refills: 2 | Status: SHIPPED | OUTPATIENT
Start: 2020-10-29

## 2020-10-15 RX ORDER — OXCARBAZEPINE 300 MG/1
300 TABLET, FILM COATED ORAL 2 TIMES DAILY
Qty: 30 TABLET | Refills: 2 | Status: SHIPPED | OUTPATIENT
Start: 2020-10-29

## 2020-10-15 RX ADMIN — ARIPIPRAZOLE 5 MG: 5 TABLET ORAL at 08:58

## 2020-10-15 RX ADMIN — METOPROLOL TARTRATE 25 MG: 25 TABLET, FILM COATED ORAL at 08:58

## 2020-10-15 RX ADMIN — OXCARBAZEPINE 300 MG: 300 TABLET, FILM COATED ORAL at 08:58

## 2020-10-15 NOTE — DISCHARGE SUMMARY
DISCHARGE SUMMARY      Patient ID:  Shera Siemens  [id number]  82 y.o.  1995      Admit date: 10/10/2020    Discharge date and time: 10/15/2020    Admitting Physician: Patsy Coe MD     Discharge Physician: Dr Amber Junior MD    Admission Diagnoses: Bipolar depression [facility]) [F31.9]    Admission Condition: poor    Discharged Condition: stable    Admission Circumstance:     Ms. Shera Siemens is a 23 y.o. female with a history of depression vs. Bipolar Disorder, and self-reported PTSD, who presented to the ER with c/o depressed mood and suicidal ideations. Per ER notes, \"Pt is a 24 yo.  [place] female who arrived to the ED after friend that she is living with called 911 due to pt having SI ideations. Pt states that she was talking to roommate about \"Emotional things, I don't know why things always happen to me\".  Pt states that the roommate had left room and returned later to check on her and,  \"I was sitting there singing and had a phone cord tied tight around my neck. He said it was like I was going in and out and not paying attention to him\". Pt states that she removed telephone from around her neck. Pt has been having increased depression since losing apartment and having trouble with transportation. Pt does not know how to drive and has never had license.  Pt also states a stressor being Denette Fellers \"my boyfriend broke up with me\". Pt was very tearful and sad at times throughout assessment. Pt states that she has not been eating well. Pt has previous SI attempt in November 2019 and was admitted to [facility] [place] [facility] [place]. Pt attempted by taking bottles of medication (wellbutrin and trazodone) that she was prescribed. Pt admits to not taking any medications since attempt last year \"I was afraid to get them filled because of what I did last time. \"  Pt has charges at this time and is too be in court on 10/13/2020 for assault and theft charges.  Pt denies probation for any previous charges.  Pt has a negative tox screen and alcohol level. Pt has history of seizure as a juvenile, stated that last seizure was when she was 15 yo. Pt unable to give an exact time. \"     HISTORY OF PRESENT ILLNESS:       The patient is a 23 y.o. female with significant past history of Bipolar II disorder, who presented to the ER with c/o depressed mood and suicidal ideations. When interviewed today, the patient said she had been feeling depressed and suicidal since September, for about a month. She said she had been stressed out because she had first lost her job, then her home, then her boyfriend broke up with her a few days ago (she suspects he was cheating on her). She said her sleep was \"good\", but her appetite has been poor, and she said she had been unable to eat for the past couple of days. She said she had been thinking of choking herself; she had the phone  cord wrapped around her neck but was found by the roommate. She acknowledged a previous suicide attempt 2 years ago by overdose on Wellbutrin and Trazodone, because of her strained relationship with her mother.  She denied having hallucinations, paranoia or other delusions.      Stressors: denies     The patient is not currently receiving care for the above psychiatric illness.     Medications Prior to Admission:   Prescriptions Prior to Admission   Medications Prior to Admission: medroxyPROGESTERone (DEPO-PROVERA) 150 MG/ML injection, Inject 150 mg into the muscle every 3 months Indications: pt is unsure of dosage        Compliance: no     Psychiatric Review of Systems       Depression: yes     Yulia or Hypomania:  yes - in the past     Panic Attacks:  no     Phobias:  no     Obsessions and Compulsions:  no     PTSD : yes     Hallucinations:  no     Delusions:  no     Substance Abuse History:  ETOH: denies   Marijuana: denies  Opiates: denies  Other Drugs: denies        Past Psychiatric History:  Prior Diagnosis:  Bipolar II disorder  Psychiatrist: no  Therapist:no  Hospitalization: yes  Hx of Suicidal Attempts: yes  Hx of violence:  no  ECT: no  Previous discontinued Psychiatric Med Trials:  Wellbutrin, Trazodone        PAST MEDICAL/PSYCHIATRIC HISTORY:   Past Medical History:   Diagnosis Date    Depression     PTSD (post-traumatic stress disorder)     Seizures ([facility])     as a child last seizure 14years old       FAMILY/SOCIAL HISTORY:  Family History   Problem Relation Age of Onset    No Known Problems Mother         mental illness    No Known Problems Father      Social History     Socioeconomic History    Marital status: Single     Spouse name: Not on file    Number of children: Not on file    Years of education: Not on file    Highest education level: Not on file   Occupational History    Not on file   Social Needs    Financial resource strain: Not on file    Food insecurity     Worry: Not on file     Inability: Not on file    Transportation needs     Medical: Not on file     Non-medical: Not on file   Tobacco Use    Smoking status: Never Smoker    Smokeless tobacco: Never Used   Substance and Sexual Activity    Alcohol use: No    Drug use: No    Sexual activity: Yes     Partners: Male   Lifestyle    Physical activity     Days per week: Not on file     Minutes per session: Not on file    Stress: Not on file   Relationships    Social connections     Talks on phone: Not on file     Gets together: Not on file     Attends Denominational service: Not on file     Active member of club or organization: Not on file     Attends meetings of clubs or organizations: Not on file     Relationship status: Not on file    Intimate partner violence     Fear of current or ex partner: Not on file     Emotionally abused: Not on file     Physically abused: Not on file     Forced sexual activity: Not on file   Other Topics Concern    Not on file   Social History Narrative    Not on file       MEDICATIONS:    Current Facility-Administered Medications:    ARIPiprazole (ABILIFY) tablet 5 mg, 5 mg, Oral, Daily, Shereen Javier MD, 5 mg at 10/15/20 0858    OXcarbazepine (TRILEPTAL) tablet 300 mg, 300 mg, Oral, BID, Shereen Javier MD, 300 mg at 10/15/20 7011    metoprolol tartrate (LOPRESSOR) tablet 25 mg, 25 mg, Oral, Daily, Justice Black, APRN - CNP, 25 mg at 10/15/20 0858    benztropine mesylate (COGENTIN) injection 2 mg, 2 mg, Intramuscular, BID PRN, Shereen Javier MD    magnesium hydroxide (MILK OF MAGNESIA) 400 MG/5ML suspension 30 mL, 30 mL, Oral, Daily PRN, Shereen Javier MD    aluminum & magnesium hydroxide-simethicone (MAALOX) 200-200-20 MG/5ML suspension 30 mL, 30 mL, Oral, Q6H PRN, Shereen Javier MD    hydrOXYzine (VISTARIL) injection 50 mg, 50 mg, Intramuscular, Q6H PRN **OR** hydrOXYzine (VISTARIL) capsule 50 mg, 50 mg, Oral, Q6H PRN, Shereen Javier MD    haloperidol lactate (HALDOL) injection 5 mg, 5 mg, Intramuscular, Q6H PRN **OR** haloperidol (HALDOL) tablet 5 mg, 5 mg, Oral, Q6H PRN, Shereen Javier MD    Examination:  /86   Pulse 102   Temp 98 °F (36.7 °C) (Oral)   Resp 20   Ht 5' 7\" (1.702 m)   Wt (!) 390 lb (176.9 kg)   LMP  (LMP Unknown)   SpO2 97%   BMI 61.08 kg/m²   Gait - steady    HOSPITAL COURSE[de-identified]  Following admission to the hospital, patient had a complete physical exam and blood work up  Patient was monitored closely with suicide precaution  Patient was started on medication as listed below  Was encouraged to participate in group and other milieu activity  Patient started to feel better with this combination of treatment. Significant progress in the symptoms since admission.     Mood better, with the score of 2/10 - bad  No AVH or paranoid thoughts  No Hopeless or worthless feeling  No active SI/HI  Appetite:  [x] Normal  [] Increased  [] Decreased    Sleep:       [x] Normal  [] Fair       [] Poor            Energy:    [x] Normal  [] Increased  [] Decreased     SI [] Present  [x] Absent  HI  []Present  [x] Absent Aggression:  [] yes  [] no  Patient is [x] able  [] unable to CONTRACT FOR SAFETY   Medication side effects(SE):  [x] None(Psych. Meds.) [] Other      Mental Status Examination on discharge:    Level of consciousness:  within normal limits   Appearance:  well-appearing  Behavior/Motor:  no abnormalities noted  Attitude toward examiner:  attentive and good eye contact  Speech:  spontaneous, normal rate and normal volume   Mood: euthymic  Affect:  mood congruent  Thought processes:  coherent   Thought content:  Suicidal Ideation:  denies suicidal ideation  Delusions:  no evidence of delusions  Perceptual Disturbance:  denies any perceptual disturbance  Cognition:  oriented to person, place, and time   Concentration intact  Memory intact  Insight good   Judgement fair   Fund of Knowledge adequate      ASSESSMENT:  Patient symptoms are:  [x] Well controlled  [x] Improving  [] Worsening  [] No change      Diagnosis:  Active Problems:    Bipolar depression ([facility] [address]  Resolved Problems:    * No resolved hospital problems. *      LABS:    No results for input(s): WBC, HGB, PLT in the last 72 hours. No results for input(s): NA, K, CL, CO2, BUN, CREATININE, GLUCOSE in the last 72 hours. No results for input(s): BILITOT, ALKPHOS, AST, ALT in the last 72 hours. Lab Results   Component Value Date    LABAMPH Neg 10/10/2020    BARBSCNU Neg 10/10/2020    LABBENZ Neg 10/10/2020    LABMETH Neg 10/10/2020    OPIATESCREENURINE Neg 10/10/2020    PHENCYCLIDINESCREENURINE Neg 10/10/2020    ETOH <10 10/10/2020     Lab Results   Component Value Date    TSH 1.340 10/10/2020     No results found for: LITHIUM  No results found for: VALPROATE, CBMZ    RISK ASSESSMENT AT DISCHARGE: Low risk for suicide and homicide. Treatment Plan:  Reviewed current Medications with the patient. Education provided on the complaince with treatment.     Risks, benefits, side effects, drug-to-drug interactions and alternatives to treatment were discussed. Encourage patient to attend outpatient follow up appointment and therapy. Patient was advised to call the outpatient provider, visit the nearest ED or call 911 if symptoms are not manageable. Patient's family member was contacted prior to the discharge. Medication List      START taking these medications    metoprolol tartrate 25 MG tablet  Commonly known as:  LOPRESSOR  Take 1 tablet by mouth Daily  Start taking on:  October 29, 2020        CHANGE how you take these medications    ARIPiprazole 5 MG tablet  Commonly known as:  ABILIFY  Take 1 tablet by mouth daily  Start taking on:  October 29, 2020  What changed: These instructions start on October 29, 2020. If you are unsure what to do until then, ask your doctor or other care provider. OXcarbazepine 300 MG tablet  Commonly known as:  TRILEPTAL  Take 1 tablet by mouth 2 times daily  Start taking on:  October 29, 2020  What changed: These instructions start on October 29, 2020. If you are unsure what to do until then, ask your doctor or other care provider.         CONTINUE taking these medications    medroxyPROGESTERone 150 MG/ML injection  Commonly known as:  DEPO-PROVERA           Where to Get Your Medications      These medications were sent to [facility] [address], [address] [facility] [id number] [place] [address] Herb Douse [phone]  [address] [place] [address] [address] [id number]    Phone:  [phone]   · ARIPiprazole 5 MG tablet  · metoprolol tartrate 25 MG tablet  · OXcarbazepine 300 MG tablet           Reason for more than one antipsychotic:   [x] N/A  [] 3 failed monotherapy(drugs tried):  [] Cross over to a new antipsychotic  [] Taper to monotherapy from polypharmacy  [] Augmentation of Clozapine therapy due to treatment resistance to single therapy        TIME SPEND - 35 MINUTES TO COMPLETE THE EVALUATION, DISCHARGE SUMMARY, MEDICATION RECONCILIATION AND FOLLOW UP CARE     Elena Duran  10/15/2020  9:46 AM

## 2020-10-15 NOTE — PROGRESS NOTES
Patient did not attend group despite staff encouragement.   Electronically signed by Denisha Arzate on 10/14/2020 at 9:35 PM

## 2020-10-15 NOTE — PROGRESS NOTES
Pt. attended the 0900 community meeting.  Electronically signed by Jose Alfredo Perea on 10/15/2020 at 1:53 PM

## 2020-10-15 NOTE — GROUP NOTE
Group Therapy Note    Date: 10/15/2020    Group Start Time: 1100  Group End Time: 1200  Group Topic: Psychoeducation    [facility]    Leticia De Los Santos, CTRS        Group Therapy Note    Attendees: 7         Patient's Goal:  \"to go home\"    Notes:  Pt. attended the 1100 skill group. Pt. was happy and pleasant upon approach. Worked on coloring and threw it out. Just wants to go home. Status After Intervention:  Improved    Participation Level:  Active Listener and Interactive    Participation Quality: Appropriate, Attentive and Sharing      Speech:  normal      Thought Process/Content: Logical      Affective Functioning: Congruent      Mood: calm      Level of consciousness:  Alert, Oriented x4 and Attentive      Response to Learning: Progressing to goal      Endings: None Reported    Modes of Intervention: Education, Support, Socialization and Activity      Discipline Responsible: Psychoeducational Specialist      Signature:  Priyanka Mann

## 2020-10-15 NOTE — GROUP NOTE
Group Therapy Note    Date: 10/15/2020    Group Start Time: 1000  Group End Time: 1030  Group Topic: Psychotherapy    [facility] JÚNIOR Gallagher        Group Therapy Note    Attendees: 7         Patient's Goal: To participate in group therapy process    Notes:      Patient reported feeling tired today but medications are helping her with her condiditon      Status After Intervention:  Improved    Participation Level: Interactive    Participation Quality: Appropriate      Speech:  normal      Thought Process/Content: Logical      Affective Functioning: Congruent      Mood: euthymic      Level of consciousness:  Alert and Oriented x4      Response to Learning: Able to verbalize current knowledge/experience      Endings: None Reported    Modes of Intervention: Support      Discipline Responsible: /Counselor      Signature:  Hasmukh Gallagher

## 2021-05-12 NOTE — CARE COORDINATION
Neurology Consult Note:    Patient: Bubba Alamo  : 1955  Date: 21  Referring provider: Christopher James MD    Referral to Neurology: hx hemorrhagic stroke, right external capsular region, chronic. Dear Christopher James MD:    Thank you for your referral of Bubba Alamo  A 64 y/o woman s/p small hemorrhagic stroke of the right external capsular region with hx medical/vascular risk factors of HTN, DM and hyperlipidemia. She denies tobacco use history or tobacco exposure. There is no prior history of TIA/stroke syndrome. She noted that 6-7 months ago had difficulty with speech and was slurring her words which was recognized by family and friends. She denied hemiparesis, hemisensory loss, facial droop, dysphagia, visual loss, diplopia, headache, nausea or vomiting, spinning vertigo, tremor, gait ataxia, confusion, or loss of consciousness. She denies prior history of TIA/stroke syndrome. She has an acquired left foot drop. Cardiology consult letter reviewed of Dr.M. Wray Toney, 2021. Lab Data: reviewed from -21, hyperlipidemia, HgA1-c 8.1, . Imaging Data: MR brain scan reviewed with patient, 2021:  1. No acute intracranial abnormality. 2. Small chronic hemorrhage in the right external capsular region. 3/23/2021, Carotid ultrasound, no hemodynamically significant ICA stenosis. Antegrade bilateral vertebral artery flow. ECG: Sinus rhythm, within normal limits, 3/16/2021  2D-Echocardiogram, pending completion.     Current Outpatient Medications   Medication Sig Dispense Refill    esomeprazole (NEXIUM) 40 MG delayed release capsule Take 1 capsule by mouth every morning (before breakfast) 90 capsule 1    atorvastatin (LIPITOR) 40 MG tablet Take 1 tablet by mouth daily 30 tablet 3    sitaGLIPtan-metFORMIN (JANUMET)  MG per tablet Take 1 tablet by mouth daily 30 tablet 3    amLODIPine (NORVASC) 5 MG tablet Take 1 tablet by mouth Pt. Arrived on unit calm and cooperative. Skin check negative. Pt. Familiar with our unit. Hygiene supplies given. daily One in morning 30 tablet 3    lisinopril (PRINIVIL;ZESTRIL) 30 MG tablet Take 1 tablet by mouth daily One at night 30 tablet 3    venlafaxine (EFFEXOR XR) 150 MG extended release capsule Take 1 capsule by mouth daily 90 capsule 1    potassium chloride (MICRO-K) 10 MEQ extended release capsule Take 1 capsule by mouth daily 180 capsule 0    metFORMIN (GLUCOPHAGE-XR) 500 MG extended release tablet TAKE 1 TABLET BY MOUTH TWICE A DAY WITH MEALS 180 tablet 1    atorvastatin (LIPITOR) 10 MG tablet Take 1 tablet by mouth daily 90 tablet 1    atenolol (TENORMIN) 50 MG tablet Take 1 tablet by mouth daily 90 tablet 1     No current facility-administered medications for this visit.       No Known Allergies    Patient Active Problem List   Diagnosis    Right foot infection    Diabetic ulcer of left foot associated with type 1 diabetes mellitus ([facility]    Type 2 diabetes mellitus without complication, without long-term current use of insulin ([facility]    Essential hypertension    Gastroesophageal reflux disease without esophagitis    Mixed hyperlipidemia    Subacute osteomyelitis of foot ([facility]    Dysarthria    Hemorrhagic stroke [facility])       Past Medical History:   Diagnosis Date    Abnormal mammogram of left breast 05/2019    Anxiety     Depression     Diabetes mellitus ([facility]     Dysarthria 5/12/2021    Erosive esophagitis     Esophageal stricture     GERD (gastroesophageal reflux disease)     Gestational diabetes     Hemorrhagic stroke ([facility] 5/12/2021    Hyperlipidemia     Hypertension     Hypokalemia     Osteoarthritis     knees    Postmenopausal     Type 2 diabetes mellitus without complication [facility])        Past Surgical History:   Procedure Laterality Date    BREAST BIOPSY Left 05/2019    Dr. Darek Sahni      x4    CHOLECYSTECTOMY      COLONOSCOPY  11/2011    HYSTERECTOMY      JOINT REPLACEMENT Right     knee       Family History   Problem Relation Age of Onset    Diabetes Mother         complication     Coronary Art Dis Father     Rheum Arthritis Sister     Mult Sclerosis Brother     Diabetes Brother        Social History     Socioeconomic History    Marital status:      Spouse name: Not on file    Number of children: Not on file    Years of education: Not on file    Highest education level: Not on file   Occupational History    Not on file   Social Needs    Financial resource strain: Not on file    Food insecurity     Worry: Never true     Inability: Never true    Transportation needs     Medical: No     Non-medical: No   Tobacco Use    Smoking status: Never Smoker    Smokeless tobacco: Never Used   Substance and Sexual Activity    Alcohol use: No    Drug use: No    Sexual activity: Not on file   Lifestyle    Physical activity     Days per week: Not on file     Minutes per session: Not on file    Stress: Not on file   Relationships    Social connections     Talks on phone: Not on file     Gets together: Not on file     Attends Protestant service: Not on file     Active member of club or organization: Not on file     Attends meetings of clubs or organizations: Not on file     Relationship status: Not on file    Intimate partner violence     Fear of current or ex partner: Not on file     Emotionally abused: Not on file     Physically abused: Not on file     Forced sexual activity: Not on file   Other Topics Concern    Not on file   Social History Narrative    Not on file     Review of Systems   Constitutional: Negative. HENT: Negative. Eyes: Negative. Respiratory: Negative. Cardiovascular: Negative. Hyperlipidemia   Gastrointestinal: Negative. Endocrine:        Type 2 diabetes mellitus, hyperglycemia   Genitourinary: Negative. Musculoskeletal: Positive for back pain. Skin: Negative. Neurological: Positive for speech difficulty. Hematological: Negative.     Psychiatric/Behavioral: The patient is nervous/anxious. All other systems reviewed and are negative. Neurologic Exam:  BP (!) 150/70 (Site: Right Upper Arm, Position: Sitting, Cuff Size: Medium Adult)   Ht 5' 7\" (1.702 m)   Wt 194 lb (88 kg)   BMI 30.38 kg/m²   General appearance: Alert, cooperative, anxious, well-nourished, groomed, seated in the exam room, no acute distress. HEENT: Normocephalic/atraumatic. Neck: Supple, no adventitious sounds  Cardiac: RRR  Respiratory: grossly clear  Extremities: No edema, erythema or cyanosis  Skin: No apparent lesions or rashes  Musculoskeletal: No fasciculations or tremors  Mental Status: Alert, oriented x4  Speech/Language: Dysarthric, mild word hesitancy, and no paraphasic word errors or word substitutions, no aphasia. Attention span/Concentration: Grossly intact  Affect/Mood: Anxious  Insight/Judgement: Fairly good     Fund of Knowledge/Current events: Grossly intact  CN II-XII:     Pupils: Equal, reactive to light, 2.5 mm     EOM's: Full without nystagmus  Visual Fields: Full to confrontation  Fundi: Miosis to light, unable to well visualize  CN V: normal V1-V3  CN VII: No facial droop, symmetric smile  CN VIII: Hearing grossly intact  CN IX-XII: Palatal asymmetry, tongue midline  SCM/Trapezii: 5/5 power  Motor: 5/5 power in the upper and lower extremities without tremor or drift and normal motor tone without cogwheeling or spasticity, intact fine motor function of both hands, symmetric. DTR's: 1+ and symmetric in the upper extremities, 1-2+ patellar, 1+ ankle jerks, no ankle clonus, plantar responses are flexor. Sensory: Reports grossly intact subjective sensation to light touch and sharp stick testing throughout. No right/left confusion. Coordination/Gait: No gross limb dysmetria on finger-to-nose testing, no truncal or cerebellar gait ataxia, two-step turns. Assessment/Plan:  1.   Status post small hemorrhagic stroke involving the right external capsular region, MCA territory, contributing to symptoms of dysarthria and likely related to her hypertension history. She has other vascular risk factors of hyperlipidemia and diabetes mellitus which also increases her risk of ischemic stroke and atherosclerosis. 2.  Medical management of her chronic medical conditions and vascular risk factors are otherwise advisable for secondary stroke risk reduction. 3.  She reports she will be starting speech therapy as an outpatient. I recommended she practice reading several paragraphs aloud each day of a magazine or favorite book or newspaper which should help improve her enunciation ability. 4.  Patient information was provided on hemorrhagic stroke and stroke prevention. 5.  Acquired left foot drop. An NCS/EMG study was ordered. 6. 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 The left foot drop decreases her activity due to impaired balance and fear of falling. She is thus in need of a custom left ankle-foot orthosis to offset her ankle and foot deformities and improve proprioceptive/tactile feedback. The orthosis will improve gait, balance, and reduce pain, enabling the patient to better ambulate and reduce risk of falling. Sincerely,      Rodrigue Paulson MD    This note was created using speech recognition transcription software. Despite proofreading, there may be several typographical errors present that may affect the meaning of the content. Please call with any questions. Note: A total time of 40 mins.  was spent on the date of service in preparation for this visit, which included face-to-face patient care and completing clinical documentation, and including counseling and coordination of care based on clinical impression, neurologic diagnosis, review of pertinent imaging studies, test results, implementation and discussion of

## 2022-05-26 NOTE — H&P
CHIEF COMPLAINT:  Chief Complaint   Patient presents with   • Follow-up     Ongoing dry cough. Gotten worse the last 3 months. No relief with meds recently prescribed.       SUBJECTIVE:  Maddy Chen is a 79 year old female who presents for evaluation for cough refractory to guaifenesin with codeine, omeprazole, and fluticasone which was prescribed at her previous visit.  She has done well in the past with benzonatate.  She denies any fever, chills, nausea, vomiting, or diarrhea associated.    Review of systems:  Constitutional: Negative for fever and chills.   Skin: Negative for rash.   HEENT: Negative for eye drainage, rhinorrhea, ear pain, sore throat.  Respiratory:  Positive for cough, negative for wheezing or shortness of breath.    Cardiovascular: Negative for chest pain, chest pressure or palpitations.   Gastrointestinal: Negative for nausea, vomiting, diarrhea.   Genitourinary: Negative for dysuria, urgency, frequency or hematuria.  Extremities:  Negative for joint swelling or joint pain.  Neurologic:  Negative for change in sensory or motor function.   Endocrine: Negative for heat or cold intolerance.  Psychiatric: Negative for change in mood or mentation.  All other systems are reviewed and are negative except as documented in the HPI.  All other systems are reviewed and are negative except as documented in the History of Present Illness    OBJECTIVE:  PROBLEM LIST:  Patient Active Problem List   Diagnosis   • Chronic lymphoid leukemia, without mention of having achieved remission   • Unspecified constipation   • DM (diabetes mellitus) (CMS/HCC)   • Heart murmur   • Hyperlipidemia   • Insomnia   • Lumbar radiculopathy   • Hypogammaglobulinemia, acquired (CMS/[facility])   • Allergic rhinitis, cause unspecified   • Cough   • Sensorineural hearing loss, unspecified   • Anemia, unspecified   • Atrophic vaginitis   • Iron deficiency anemia, unspecified   • Unspecified adverse effect of other drug, medicinal and  [address] MEDICINE    HISTORY AND PHYSICAL EXAM    PATIENT NAME:  Juvenal Infante    MRN:  [id number]  SERVICE DATE:  10/11/2020   SERVICE TIME:  9:36 AM    Primary Care Physician: WALESKA Yanez CNP         SUBJECTIVE  CHIEF COMPLAINT:  Medical clear for inpatient psychiatry admission. Consult for medical H/P encounter. HPI:  This is a 23 y.o. female who presents with complaint of depression and suicidal ideation due to recent break-up from boyfriend. Patient states she has a history of high blood pressure and was started on a blood pressure medication last year when admitted however stopped taking it since discharge. she however denies any shortness of breath, chest pain, nausea, vomiting, fever, chills, constipation or diarrhea. PAST MEDICAL HISTORY:    Past Medical History:   Diagnosis Date    Depression     PTSD (post-traumatic stress disorder)     Seizures ([facility] [facility]     as a child last seizure 14years old     PAST SURGICAL HISTORY:  History reviewed. No pertinent surgical history.   FAMILY HISTORY:    Family History   Problem Relation Age of Onset    No Known Problems Mother         mental illness    No Known Problems Father      SOCIAL HISTORY:    Social History     Socioeconomic History    Marital status: Single     Spouse name: Not on file    Number of children: Not on file    Years of education: Not on file    Highest education level: Not on file   Occupational History    Not on file   Social Needs    Financial resource strain: Not on file    Food insecurity     Worry: Not on file     Inability: Not on file    Transportation needs     Medical: Not on file     Non-medical: Not on file   Tobacco Use    Smoking status: Never Smoker    Smokeless tobacco: Never Used   Substance and Sexual Activity    Alcohol use: No    Drug use: No    Sexual activity: Yes     Partners: Male   Lifestyle    Physical activity     Days per week: Not on file     Minutes per biological substance(995.29)   • Esophageal reflux   • Encounter for long-term (current) use of other medications   • AB (asthmatic bronchitis)   • Chronic nonallergic rhinitis   • Essential hypertension   • Malignant neoplasm of overlapping sites of right breast in female, estrogen receptor positive (CMS/HCC)       PAST HISTORIES:  I have reviewed the past medical history, family history, social history, medications and allergies listed in the medical record as obtained by my nursing staff and support staff and agree with their documentation.  Allergies, Medications, Medical History, Surgical History, Social History and Family History were reviewed and updated.    Physical ExamiNATiON:  Vital Signs:   Visit Vitals  BP (!) 140/60   Pulse (!) 56   Temp 97.9 °F (36.6 °C) (Temporal)   Ht 5' 2\" (1.575 m)   Wt 65.3 kg (143 lb 14.4 oz)   SpO2 98%   BMI 26.32 kg/m²     General: Alert, cooperative, conversive in no acute distress.  Skin: Warm and dry without rash.  Head: Normocephalic-atraumatic.  Neck: Trachea is midline.  Eyes: Normal conjunctivae and sclerae.  ENT: Mucous membranes are moist.  Cardiovascular: Regular, rate and rhythm without murmur.  Respiratory: Normal respiratory effort.  Clear to auscultation.  No wheezes, rales or rhonchi.  Gastrointestinal: Inspection: Normal.  Musculoskeletal: No deformity or edema.  Neurologic: Oriented times 3.  Cranial nerves 2-12 are grossly intact.  No focal deficits.  Psychiatric: Cooperative.  Appropriate mood and affect.    Assessment:  1. Chronic cough        Plan:  Plan for chest x-ray if symptoms continue to worsen or persist past 2 weeks without improvement.  All of her questions were answered during this face-to-face encounter.  She expressed understanding and agrees with the plan.  Okay for refill if medication provides relief.    Orders Placed This Encounter   • benzonatate (TESSALON PERLES) 100 MG capsule     Return if symptoms worsen or fail to  improve.    Treatment options discussed with patient and explained in detail. The risks, benefits, and potential side effects of possible medications were reviewed. Alternatives were discussed. Medication instructions and consequences of not taking the medications were discussed. Patient's understanding was assessed and patient agreed with the plan. Monitoring parameters and expected course outlined. Patient to call or come in if symptoms fail to respond as outlined or worsen in any way.   kg/m²   CONSTITUTIONAL:  awake, alert, cooperative, no apparent distress, and appears stated age  EYES:  Lids and lashes normal, pupils equal, round and reactive to light, extra ocular muscles intact, sclera clear, conjunctiva normal  ENT:  Normocephalic, without obvious abnormality, atraumatic, sinuses nontender on palpation, external ears without lesions, oral pharynx with moist mucus membranes, tonsils without erythema or exudates, gums normal and good dentition. NECK:  Supple, symmetrical, trachea midline, no adenopathy, thyroid symmetric, not enlarged and no tenderness, skin normal  LUNGS:  No increased work of breathing, good air exchange, clear to auscultation bilaterally, no crackles or wheezing  CARDIOVASCULAR:  Normal apical impulse, regular rate and rhythm, normal S1 and S2, no S3 or S4, and no murmur noted  ABDOMEN:  No scars, normal bowel sounds, soft, non-distended, non-tender, no masses palpated, no hepatosplenomegally  MUSCULOSKELETAL:  There is no redness, warmth, or swelling of the joints. Full range of motion noted. Motor strength is 5 out of 5 all extremities bilaterally. Tone is normal.  NEUROLOGIC:  Awake, alert, oriented to name, place and time. Cranial nerves II-XII are grossly intact. SKIN:  no bruising or bleeding, normal skin color, texture, turgor, no redness, warmth, or swelling and no jaundice    DATA:     Diagnostic tests reviewed for today's visit:    Most recent labs and imaging results reviewed.        ASSESSMENT AND PLAN:     Bipolar depression ([facility] [facility]): Continue current medication and management with psychiatrist    HTN/tacycardia:partly d/t obesity, EKG- SR,  start on metoprolol 25mg and  titrate to control HR and B/p     Morbid obesity: activity and weight loss encouraged    Abnormal UA: pt asymptomatic, f/u urine culture    Hx seizure: Patient states she had seizure as a child and has not been on anti-epileptic medication for years and has had no seizures since she was a child    VTE Prophylaxis: Patient encouraged  CODE STATUS: Full    This is only a history and physical examination and not medical management. The patient is to contact and follow up with their primary care physician and go over any abnormal labs, imaging, findings, medical concerns, or conditions that we have and have not addressed during this encounter.     Plan of care discussed with: patient    SIGNATURE: WALESKA Saleem CNP  DATE: October 11, 2020  TIME: 9:36 AM

## 2022-09-06 LAB — GLUCOSE BLD-MCNC: 132 MG/DL (ref 74–99)

## 2022-09-07 LAB
GLUCOSE BLD-MCNC: 126 MG/DL (ref 74–99)
GLUCOSE BLD-MCNC: 140 MG/DL (ref 74–99)

## 2022-09-08 LAB
GLUCOSE BLD-MCNC: 120 MG/DL (ref 74–99)
GLUCOSE BLD-MCNC: 121 MG/DL (ref 74–99)

## 2022-09-09 LAB — GLUCOSE BLD-MCNC: 135 MG/DL (ref 74–99)
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
